# Patient Record
Sex: FEMALE | Race: WHITE | NOT HISPANIC OR LATINO | URBAN - METROPOLITAN AREA
[De-identification: names, ages, dates, MRNs, and addresses within clinical notes are randomized per-mention and may not be internally consistent; named-entity substitution may affect disease eponyms.]

---

## 2017-03-13 ENCOUNTER — CHARTING TRANS (OUTPATIENT)
Dept: URGENT CARE | Age: 47
End: 2017-03-13

## 2017-03-13 ASSESSMENT — PAIN SCALES - GENERAL: PAINLEVEL_OUTOF10: 3

## 2017-05-01 ENCOUNTER — CHARTING TRANS (OUTPATIENT)
Dept: URGENT CARE | Age: 47
End: 2017-05-01

## 2017-05-01 ENCOUNTER — MYAURORA ACCOUNT LINK (OUTPATIENT)
Dept: OTHER | Age: 47
End: 2017-05-01

## 2017-05-01 ASSESSMENT — PAIN SCALES - GENERAL: PAINLEVEL_OUTOF10: 3

## 2017-07-30 DIAGNOSIS — N92.1 BREAKTHROUGH BLEEDING ON OCPS: ICD-10-CM

## 2017-07-31 ENCOUNTER — TELEPHONE (OUTPATIENT)
Dept: FAMILY MEDICINE CLINIC | Facility: CLINIC | Age: 47
End: 2017-07-31

## 2017-07-31 RX ORDER — NORGESTIMATE AND ETHINYL ESTRADIOL 0.25-0.035
1 KIT ORAL DAILY
Qty: 28 TABLET | Refills: 1 | Status: SHIPPED | OUTPATIENT
Start: 2017-07-31 | End: 2017-09-26

## 2017-07-31 NOTE — TELEPHONE ENCOUNTER
Left message for patient that sprintec 28 filled for 2 more months only. Patient needs physical, due in august.    No future appointments.

## 2017-07-31 NOTE — TELEPHONE ENCOUNTER
Refill request for sprintec. Protocol failed, unable to approve. LOV 9/10/2016 LF 8/22/2016. No future appointments.

## 2017-07-31 NOTE — TELEPHONE ENCOUNTER
Please call in Rx of Sprintec 28 dispensed 1 with 1 refill and needs to schedule annual physical.  Due 8/22/2017. give 1 to 2 months to complete physical.  If does not complete, will not continue refilling medication. Please inform.

## 2017-08-03 ENCOUNTER — TELEPHONE (OUTPATIENT)
Dept: FAMILY MEDICINE CLINIC | Facility: CLINIC | Age: 47
End: 2017-08-03

## 2017-08-03 DIAGNOSIS — F33.1 MODERATE EPISODE OF RECURRENT MAJOR DEPRESSIVE DISORDER (HCC): ICD-10-CM

## 2017-08-03 RX ORDER — VENLAFAXINE HYDROCHLORIDE 75 MG/1
CAPSULE, EXTENDED RELEASE ORAL
Qty: 90 CAPSULE | Refills: 0 | Status: SHIPPED | OUTPATIENT
Start: 2017-08-03 | End: 2017-08-04

## 2017-08-03 NOTE — TELEPHONE ENCOUNTER
LMOM last refill given to patient, needs physical. Called to schedule. Patient also has existing mammogram order that is good for 2 additional weeks before expiring. Patient can call and speak to nurse with questions. No future appointments.

## 2017-08-03 NOTE — TELEPHONE ENCOUNTER
Not seen in almost a year. Will give 1 refill but no others until completes physical and can address depression screen and monitoring at that time. Remind patient her mammogram is still good and should complete in the next 2 weeks prior to expiring.   She

## 2017-08-03 NOTE — TELEPHONE ENCOUNTER
Refill request for venlafaxine. No protocol, cannot approve. LOV 10/5/2016. LF 7/26/2016 #90 w/ 0. Please advise.

## 2017-08-04 DIAGNOSIS — F33.1 MODERATE EPISODE OF RECURRENT MAJOR DEPRESSIVE DISORDER (HCC): ICD-10-CM

## 2017-08-04 RX ORDER — VENLAFAXINE HYDROCHLORIDE 75 MG/1
CAPSULE, EXTENDED RELEASE ORAL
Qty: 90 CAPSULE | Refills: 0 | Status: SHIPPED | OUTPATIENT
Start: 2017-08-04 | End: 2017-09-11

## 2017-08-04 NOTE — TELEPHONE ENCOUNTER
Refill request for venlafaxine, rx from yesterday did not send. No protocol, cannot fill. LOV 10/5/2016. LM for patient that she needs OV.     Future Appointments  Date Time Provider Tamara Reddy   9/11/2017 9:00 AM Tanvir Mg DO EM[facility] 30 [facility] [place]

## 2017-09-11 ENCOUNTER — OFFICE VISIT (OUTPATIENT)
Dept: FAMILY MEDICINE CLINIC | Facility: CLINIC | Age: 47
End: 2017-09-11

## 2017-09-11 VITALS
WEIGHT: 174.19 LBS | RESPIRATION RATE: 18 BRPM | BODY MASS INDEX: 27.02 KG/M2 | SYSTOLIC BLOOD PRESSURE: 100 MMHG | DIASTOLIC BLOOD PRESSURE: 64 MMHG | HEIGHT: 67.2 IN | OXYGEN SATURATION: 98 % | HEART RATE: 78 BPM | TEMPERATURE: 99 F

## 2017-09-11 DIAGNOSIS — Z13.228 SCREENING FOR ENDOCRINE, NUTRITIONAL, METABOLIC AND IMMUNITY DISORDER: ICD-10-CM

## 2017-09-11 DIAGNOSIS — Z00.00 ANNUAL PHYSICAL EXAM: Primary | ICD-10-CM

## 2017-09-11 DIAGNOSIS — Z13.0 SCREENING FOR ENDOCRINE, NUTRITIONAL, METABOLIC AND IMMUNITY DISORDER: ICD-10-CM

## 2017-09-11 DIAGNOSIS — Z13.21 SCREENING FOR ENDOCRINE, NUTRITIONAL, METABOLIC AND IMMUNITY DISORDER: ICD-10-CM

## 2017-09-11 DIAGNOSIS — Z13.0 SCREENING FOR IRON DEFICIENCY ANEMIA: ICD-10-CM

## 2017-09-11 DIAGNOSIS — Z12.4 SCREENING FOR CERVICAL CANCER: ICD-10-CM

## 2017-09-11 DIAGNOSIS — Z13.6 SCREENING FOR HEART DISEASE: ICD-10-CM

## 2017-09-11 DIAGNOSIS — Z12.39 SCREENING FOR BREAST CANCER: ICD-10-CM

## 2017-09-11 DIAGNOSIS — F33.1 MODERATE EPISODE OF RECURRENT MAJOR DEPRESSIVE DISORDER (HCC): ICD-10-CM

## 2017-09-11 DIAGNOSIS — Z13.29 SCREENING FOR ENDOCRINE, NUTRITIONAL, METABOLIC AND IMMUNITY DISORDER: ICD-10-CM

## 2017-09-11 DIAGNOSIS — N92.1 BREAKTHROUGH BLEEDING ON OCPS: ICD-10-CM

## 2017-09-11 PROCEDURE — 99396 PREV VISIT EST AGE 40-64: CPT | Performed by: FAMILY MEDICINE

## 2017-09-11 RX ORDER — VENLAFAXINE HYDROCHLORIDE 75 MG/1
75 CAPSULE, EXTENDED RELEASE ORAL
Qty: 90 CAPSULE | Refills: 3 | Status: SHIPPED | OUTPATIENT
Start: 2017-09-11 | End: 2018-11-09

## 2017-09-11 RX ORDER — VENLAFAXINE HYDROCHLORIDE 75 MG/1
CAPSULE, EXTENDED RELEASE ORAL
Qty: 90 CAPSULE | Refills: 3 | Status: CANCELLED | OUTPATIENT
Start: 2017-09-11

## 2017-09-11 RX ORDER — VENLAFAXINE HYDROCHLORIDE 75 MG/1
75 CAPSULE, EXTENDED RELEASE ORAL
Qty: 90 CAPSULE | Refills: 3 | Status: SHIPPED | OUTPATIENT
Start: 2017-09-11 | End: 2017-09-11 | Stop reason: CLARIF

## 2017-09-11 NOTE — PATIENT INSTRUCTIONS
Perform labs fasting 8 hours with water or black coffee or or black tea diet  soda only prior to exam.    -Encourage healthy diet of whole food and avoid processed food and sugary drinks and sodas.   Diet should include lean meats and vegetables including 5 Blood pressure All women in this age group Every 2 years if your blood pressure is less than 120/80 mm Hg; yearly if your systolic blood pressure is 120 to 139 mm Hg, or your diastolic blood pressure reading is 80 to 89 mm Hg   Breast cancer All women in t Hepatitis A Women at increased risk for infection – talk with your healthcare provider 2 doses given 6 months apart   Hepatitis B Women at increased risk for infection – talk with your healthcare provider 3 doses over 6 months; second dose should be given Date Last Reviewed: 8/27/2015  © 4671-7758 [address], [address] [place], [place] [address]. All rights reserved. This information is not intended as a substitute for professional medical care.  Always follow your healthcare professional · Inactivity makes your bones lose strength and become thinner. Over time, thin bones may break. Women who aren't active are at a high risk for osteoporosis. · Certain medicines, such as cortisone, increase bone loss. They also decrease bone growth.  Ask y Cheddar cheese   205 mg/1 oz.   Navy beans, cooked   79 mg/1/2 cup   Kale   90 mg/1/2 cup   Ice cream strawberry   79 mg/1/2 cup           Orange, navel   56 mg/1 medium   Note: Calcium levels may vary depending on brand and size.   Daily calcium needs  14- · Rickets, in children  You may also need this test if you are at risk for low vitamin D levels.  Risks include:  · Being an older adult  · Having difficulty absorbing fat from your diet  · Having chronic kidney disease  · Have dark skin pigmentation  · Travis Francisco Taking a blood sample with a needle carries risks that include bleeding, infection, bruising, and a sense of lightheadedness. When the needle pricks your arm, you may feel a slight stinging sensation or pain.  Afterward, the site may be slightly sore.   [place]

## 2017-09-11 NOTE — PROGRESS NOTES
REASON FOR VISIT:    Clement Moreira is a 51year old female who presents for an [address] , not sexually active  focussing on her career. Has 13year old son. Recently promoted at job. Denies any depressive symptoms.   Gisel : 192 lb  05/23/16 : 194 lb 3.2 oz    Body mass index is 27.12 kg/m².     No results found for: GLUCOSE  No results found for: CHOLEST  No results found for: HDL  No results found for: TRIGLY  No results found for: LDL  No results found for: AST  No results kg/m².       Preventive Services for Which Recommendations Vary with Risk Recommendation Internal Lab or Procedure External Lab or Procedure   Cholesterol Screening Recommended screening varies with age, risk and gender No results found for: LDL, LDLC    Laura White Loss (MOD      SOCIAL HISTORY:   Smoking status: Never Smoker                                                              Smokeless tobacco: Never Used                      Alcohol use: Yes           0.6 oz/week     Glasses of wine: 1 per week     Comment bilaterally. No CMT. No speculum since Pap not needed.   RECTAL: Patent  MUSCULOSKELETAL: back is not tender, FROM of the back  EXTREMITIES: no cyanosis, clubbing or edema  NEURO: Oriented times three, cranial nerves are intact, motor and sensory are ninoska counseling perfomed  Exercise counseling perfomed  STI Prevention counseling perfomed    SUGGESTED VACCINATIONS - Influenza, Pneumococcal, Zoster, Tetanus     Immunization History   Administered Date(s) Administered   • >= 3 Yrs, Influenza ABOCOKS,(03544),

## 2017-09-18 ENCOUNTER — HOSPITAL ENCOUNTER (OUTPATIENT)
Dept: MAMMOGRAPHY | Age: 47
Discharge: HOME | End: 2017-09-18
Attending: FAMILY MEDICINE
Payer: COMMERCIAL

## 2017-09-18 ENCOUNTER — TELEPHONE (OUTPATIENT)
Dept: FAMILY MEDICINE CLINIC | Facility: CLINIC | Age: 47
End: 2017-09-18

## 2017-09-18 DIAGNOSIS — Z00.00 ANNUAL PHYSICAL EXAM: ICD-10-CM

## 2017-09-18 DIAGNOSIS — Z12.39 SCREENING FOR BREAST CANCER: ICD-10-CM

## 2017-09-18 PROCEDURE — 77067 SCR MAMMO BI INCL CAD: CPT | Performed by: FAMILY MEDICINE

## 2017-09-18 NOTE — TELEPHONE ENCOUNTER
Patient would like to speak to nurse or dr regarding her mammogram result. Please call back at cell [phone] or work number [phone].

## 2017-09-18 NOTE — TELEPHONE ENCOUNTER
Spoke with pt regarding Mammogram, pt is concerned about Calcifications, she will have additional imaging done as recommended by radiology.  Reassured pt, all questions answered

## 2017-09-26 DIAGNOSIS — N92.1 BREAKTHROUGH BLEEDING ON OCPS: ICD-10-CM

## 2017-09-26 RX ORDER — NORGESTIMATE AND ETHINYL ESTRADIOL 0.25-0.035
1 KIT ORAL DAILY
Qty: 28 TABLET | Refills: 2 | Status: SHIPPED | OUTPATIENT
Start: 2017-09-26 | End: 2017-12-16

## 2017-09-26 NOTE — TELEPHONE ENCOUNTER
Pt requesting refill on birth control, protocol passed, refill approved    LOV 9/11/17    LF 7/31/17 #28 w/1    Future Appointments  Date Time Provider Tamara Reddy   9/29/2017 12:40 PM [facility] RM1 [address] [facility]

## 2017-09-29 ENCOUNTER — TELEPHONE (OUTPATIENT)
Dept: FAMILY MEDICINE CLINIC | Facility: CLINIC | Age: 47
End: 2017-09-29

## 2017-09-29 ENCOUNTER — HOSPITAL ENCOUNTER (OUTPATIENT)
Dept: MAMMOGRAPHY | Facility: HOSPITAL | Age: 47
Discharge: HOME | End: 2017-09-29
Attending: FAMILY MEDICINE
Payer: COMMERCIAL

## 2017-09-29 DIAGNOSIS — R92.8 ABNORMAL MAMMOGRAM OF BOTH BREASTS: ICD-10-CM

## 2017-09-29 DIAGNOSIS — R92.1 BREAST CALCIFICATION, RIGHT: Primary | ICD-10-CM

## 2017-09-29 PROCEDURE — 77066 DX MAMMO INCL CAD BI: CPT | Performed by: FAMILY MEDICINE

## 2017-09-29 NOTE — IMAGING NOTE
Asssisted Dr. Cathy Colbert with recommendation for a right stereotactic breast biopsy for calcifications. Emotional and educational support provided. Written information provided to Kathleen Soliman.  Our breast center schedulers will call pt within 72 hour

## 2017-09-29 NOTE — TELEPHONE ENCOUNTER
Incoming call from radiology patient will need stereotactic biopsy of right breast performed, mammogram performed today. Please order biopsy if you agree w/ plan. Thank you.

## 2017-10-13 ENCOUNTER — HOSPITAL ENCOUNTER (OUTPATIENT)
Dept: MAMMOGRAPHY | Facility: HOSPITAL | Age: 47
Discharge: HOME | End: 2017-10-13
Attending: FAMILY MEDICINE
Payer: COMMERCIAL

## 2017-10-13 DIAGNOSIS — R92.1 BREAST CALCIFICATION, RIGHT: ICD-10-CM

## 2017-10-13 PROCEDURE — 88305 TISSUE EXAM BY PATHOLOGIST: CPT | Performed by: FAMILY MEDICINE

## 2017-10-13 PROCEDURE — 19081 BX BREAST 1ST LESION STRTCTC: CPT | Performed by: FAMILY MEDICINE

## 2017-10-16 ENCOUNTER — TELEPHONE (OUTPATIENT)
Dept: MAMMOGRAPHY | Facility: HOSPITAL | Age: 47
End: 2017-10-16

## 2017-10-16 NOTE — TELEPHONE ENCOUNTER
Telephoned Dian Tse and name,  verified with pt. Notified Alissa Essex [facility] of benign right breast stereotactic biopsy result. Alissa Essex Famularo reports biopsy site is healing well. Hematoma management discussed.  Radiologist recommend

## 2017-10-29 DIAGNOSIS — F33.1 MODERATE EPISODE OF RECURRENT MAJOR DEPRESSIVE DISORDER (HCC): ICD-10-CM

## 2017-10-30 DIAGNOSIS — F33.1 MODERATE EPISODE OF RECURRENT MAJOR DEPRESSIVE DISORDER (HCC): ICD-10-CM

## 2017-10-30 RX ORDER — VENLAFAXINE HYDROCHLORIDE 75 MG/1
75 CAPSULE, EXTENDED RELEASE ORAL
Qty: 90 CAPSULE | Refills: 3
Start: 2017-10-30

## 2017-10-30 RX ORDER — VENLAFAXINE HYDROCHLORIDE 75 MG/1
75 CAPSULE, EXTENDED RELEASE ORAL
Qty: 90 CAPSULE | Refills: 3 | Status: CANCELLED
Start: 2017-10-30

## 2017-10-30 NOTE — TELEPHONE ENCOUNTER
From: Bambi Galan  Sent: 10/30/2017 11:59 AM CDT  Subject: Medication Renewal Request    Esteban Henry.  Janny would like a refill of the following medications:     Venlafaxine HCl ER 75 MG Oral Capsule SR 24 Hr Anna Irene DO]   Patient Comment: I

## 2017-10-30 NOTE — TELEPHONE ENCOUNTER
From: Anali Olson  Sent: 10/29/2017 2:00 PM CDT  Subject: Medication Renewal Request    Elly Soliman would like a refill of the following medications:     Venlafaxine HCl ER 75 MG Oral Capsule SR 24 Hr Minor Flank, DO]    Preferred pharmacy Left flank pain, nausea/vomiting

## 2017-11-22 ENCOUNTER — CHARTING TRANS (OUTPATIENT)
Dept: URGENT CARE | Age: 47
End: 2017-11-22

## 2017-11-22 ENCOUNTER — MYAURORA ACCOUNT LINK (OUTPATIENT)
Dept: OTHER | Age: 47
End: 2017-11-22

## 2017-11-22 ASSESSMENT — PAIN SCALES - GENERAL: PAINLEVEL_OUTOF10: 0

## 2017-12-16 DIAGNOSIS — N92.1 BREAKTHROUGH BLEEDING ON OCPS: ICD-10-CM

## 2017-12-18 NOTE — TELEPHONE ENCOUNTER
Pt requesting refill on birth control, protocol passed, refill approved    LOV 9/11/17    LF 9/26/17 #28 w/2    No future appointments.

## 2017-12-26 ENCOUNTER — TELEPHONE (OUTPATIENT)
Dept: FAMILY MEDICINE CLINIC | Facility: CLINIC | Age: 47
End: 2017-12-26

## 2017-12-26 NOTE — TELEPHONE ENCOUNTER
Patient states MMR was drawn as she had to pay separate charge. Will wait to see if labs result in EPIC. [facility] Comp metabolic panel is in system, MMR is not. Will update patient when MMR is finalized.  Will call with test results once re

## 2017-12-26 NOTE — TELEPHONE ENCOUNTER
Form in progress, patient had labs drawn, however, titers were not drawn. Patient needs MMR titers drawn per form. Left message for patient to discuss. No future appointments.

## 2017-12-27 NOTE — TELEPHONE ENCOUNTER
Patient requested form be faxed to [facility] at [phone]. Patient had questions regarding labs, answered patients questions. Will fax form after receiving Md signature, patient also requests a copy at  for .

## 2018-03-06 ENCOUNTER — MYAURORA ACCOUNT LINK (OUTPATIENT)
Dept: OTHER | Age: 48
End: 2018-03-06

## 2018-03-06 ENCOUNTER — CHARTING TRANS (OUTPATIENT)
Dept: OTHER | Age: 48
End: 2018-03-06

## 2018-03-06 ASSESSMENT — PAIN SCALES - GENERAL: PAINLEVEL_OUTOF10: 0

## 2018-03-17 DIAGNOSIS — N92.1 BREAKTHROUGH BLEEDING ON OCPS: ICD-10-CM

## 2018-03-17 RX ORDER — NORGESTIMATE AND ETHINYL ESTRADIOL 0.25-0.035
1 KIT ORAL DAILY
Qty: 28 TABLET | Refills: 2 | Status: CANCELLED
Start: 2018-03-17

## 2018-03-19 NOTE — TELEPHONE ENCOUNTER
Pt requesting refill on birth control, protocol passed, refill approved    LOV 9/11/17    LF 12/18/17      No future appointments.

## 2018-03-19 NOTE — TELEPHONE ENCOUNTER
From: Marta Casillas  Sent: 3/17/2018 7:49 AM CDT  Subject: Medication Renewal Request    Myrna Maldonado.  Janny would like a refill of the following medications:     SPRINTEC 28 0.25-35 MG-MCG Oral Tab ROCCO [place]    Preferred pharmacy: [email or web address]

## 2018-08-28 DIAGNOSIS — N92.1 BREAKTHROUGH BLEEDING ON OCPS: ICD-10-CM

## 2018-08-28 NOTE — TELEPHONE ENCOUNTER
Pt requesting refill on birth control, protocol passed, refill approved    LOV 9/11/18    LF 3/19/18    No future appointments.

## 2018-10-28 DIAGNOSIS — F33.1 MODERATE EPISODE OF RECURRENT MAJOR DEPRESSIVE DISORDER (HCC): ICD-10-CM

## 2018-10-29 RX ORDER — VENLAFAXINE HYDROCHLORIDE 75 MG/1
75 CAPSULE, EXTENDED RELEASE ORAL
Qty: 90 CAPSULE | Refills: 3 | OUTPATIENT
Start: 2018-10-29

## 2018-10-29 NOTE — TELEPHONE ENCOUNTER
Pt requesting refill of Venlafaxine, no protocol , unable to approve:     Last Time Medication was Filled:  9/11/17    Last Office Visit with PCP: 9/11/17    No future appointments.   Refill denied, pt has not been seen in over 1 year

## 2018-11-09 ENCOUNTER — OFFICE VISIT (OUTPATIENT)
Dept: FAMILY MEDICINE CLINIC | Facility: CLINIC | Age: 48
End: 2018-11-09
Payer: COMMERCIAL

## 2018-11-09 VITALS
OXYGEN SATURATION: 98 % | WEIGHT: 201 LBS | DIASTOLIC BLOOD PRESSURE: 70 MMHG | HEIGHT: 68 IN | HEART RATE: 72 BPM | TEMPERATURE: 98 F | SYSTOLIC BLOOD PRESSURE: 110 MMHG | RESPIRATION RATE: 20 BRPM | BODY MASS INDEX: 30.46 KG/M2

## 2018-11-09 DIAGNOSIS — Z00.00 ANNUAL PHYSICAL EXAM: Primary | ICD-10-CM

## 2018-11-09 DIAGNOSIS — Z13.6 SCREENING FOR HEART DISEASE: ICD-10-CM

## 2018-11-09 DIAGNOSIS — Z13.21 SCREENING FOR ENDOCRINE, NUTRITIONAL, METABOLIC AND IMMUNITY DISORDER: ICD-10-CM

## 2018-11-09 DIAGNOSIS — N95.1 HOT FLUSHES, PERIMENOPAUSAL: ICD-10-CM

## 2018-11-09 DIAGNOSIS — Z23 NEED FOR VACCINATION: ICD-10-CM

## 2018-11-09 DIAGNOSIS — Z13.228 SCREENING FOR ENDOCRINE, NUTRITIONAL, METABOLIC AND IMMUNITY DISORDER: ICD-10-CM

## 2018-11-09 DIAGNOSIS — F33.1 MODERATE EPISODE OF RECURRENT MAJOR DEPRESSIVE DISORDER (HCC): ICD-10-CM

## 2018-11-09 DIAGNOSIS — Z13.0 SCREENING FOR IRON DEFICIENCY ANEMIA: ICD-10-CM

## 2018-11-09 DIAGNOSIS — Z13.0 SCREENING FOR ENDOCRINE, NUTRITIONAL, METABOLIC AND IMMUNITY DISORDER: ICD-10-CM

## 2018-11-09 DIAGNOSIS — Z13.29 SCREENING FOR ENDOCRINE, NUTRITIONAL, METABOLIC AND IMMUNITY DISORDER: ICD-10-CM

## 2018-11-09 DIAGNOSIS — N92.1 BREAKTHROUGH BLEEDING ON OCPS: ICD-10-CM

## 2018-11-09 DIAGNOSIS — Z12.39 SCREENING FOR MALIGNANT NEOPLASM OF BREAST: ICD-10-CM

## 2018-11-09 PROCEDURE — 90686 IIV4 VACC NO PRSV 0.5 ML IM: CPT | Performed by: FAMILY MEDICINE

## 2018-11-09 PROCEDURE — 90471 IMMUNIZATION ADMIN: CPT | Performed by: FAMILY MEDICINE

## 2018-11-09 PROCEDURE — 99396 PREV VISIT EST AGE 40-64: CPT | Performed by: FAMILY MEDICINE

## 2018-11-09 RX ORDER — NORGESTIMATE AND ETHINYL ESTRADIOL 0.25-0.035
1 KIT ORAL DAILY
Qty: 84 TABLET | Refills: 3 | Status: SHIPPED | OUTPATIENT
Start: 2018-11-09 | End: 2019-09-09

## 2018-11-09 RX ORDER — VENLAFAXINE HYDROCHLORIDE 75 MG/1
75 CAPSULE, EXTENDED RELEASE ORAL
Qty: 90 CAPSULE | Refills: 3 | Status: SHIPPED | OUTPATIENT
Start: 2018-11-09 | End: 2019-09-09

## 2018-11-09 NOTE — PROGRESS NOTES
REASON FOR VISIT:    Mavis Chua is a 51year old female who presents for an [address]. ,[place]  focussing on her career. Has 13year old son. Recently promoted at job. Denies any depressive symptoms.   Living with mg by mouth daily. Disp:  Rfl:    Multiple Vitamins-Minerals (WOMENS MULTIVITAMIN PLUS) Oral Tab Take by mouth.  Disp:  Rfl:      Wt Readings from Last 6 Encounters:  11/09/18 : 201 lb  09/11/17 : 174 lb 3.2 oz  10/03/16 : 181 lb  08/22/16 : 186 lb 6.4 oz on 09/10/2019    Chlamydia Screening Screen Annually age<25, if sex active/on OCPs; >24 high risk No results found for: CHLAMYDIA    Colonoscopy Screen Every 10 years There are no preventive care reminders to display for this patient.     Flex Sigmoidoscopy mouth daily. Disp:  Rfl:    Multiple Vitamins-Minerals (WOMENS MULTIVITAMIN PLUS) Oral Tab Take by mouth.  Disp:  Rfl:       MEDICAL INFORMATION:   Past Medical History:   Diagnosis Date   • Allergic rhinitis    • Anxiety    • Depression       Past Surgical clear  EYES:PERRLA, EOMI, normal optic disk, conjunctiva are clear  NECK: supple, no adenopathy, no bruits  CHEST: no chest tenderness  BREAST: Normal breasts bilateral.  No masses.   Axilla clear bilateral.  No nipple discharge or abnormal contour bilatera Lipid Panel [E]  - [facility] DIGITAL SCRN BILAT W/CAD (CPT=/21643); Future    2: Moderate episode of recurrent depressive disorder in remission. Negative depression screen and appears very well.   Continue venlafaxine  -contracts for safety- if suicidal or 1957 and not immune

## 2018-11-09 NOTE — PATIENT INSTRUCTIONS
Perform labs fasting 8 hours with water or black coffee or or black tea diet  soda only prior to exam.    -Encourage healthy diet of whole food and avoid processed food and sugary drinks and sodas.   Diet should include lean meats and vegetables including 5 Perimenopause is sometimes called the menopause transition. It happens in the months or years before menopause. It may begin when you reach your mid-40s. During this time, your estrogen levels go up and down and then decrease.  As a result, you may notice s © 5913-0824 [address]. [address] [facility], [address]. All rights reserved. This information is not intended as a substitute for professional medical care. Always follow your healthcare professional's instructions.         Warning Don't leave the person alone. Anyone who is at imminent risk of suicide needs psychiatric services right away. The person must be continuously monitored, and never left out of sight. Call 911 or a 24-hour suicide crisis hotline.  It can be found in the whit You need to continue exercising to keep these benefits. Your main goal is to make fitness a lifetime commitment. Build a fitness plan that you can stick with. Choose activities you like. Go slowly, especially when just starting out.  Work up to being active © 1116-3127 [address]. 1407 [facility], [address] [place]. All rights reserved. This information is not intended as a substitute for professional medical care. Always follow your healthcare professional's instructions.         Eating · Managing calories. A calorie is a unit of energy. Your body burns calories for fuel, but if you eat more calories than your body burns, the extras are stored as fat. Your healthcare provider can help you create a diet plan to manage your calories.  This w A liu part of healthy cooking is cutting down on added fat and salt. Look on the internet for lower-fat, lower-sodium recipes. Also, try these tips:  · Remove fat from meat and skin from poultry before cooking.   · Skim fat from the surface of soups and kathy

## 2018-11-27 VITALS
OXYGEN SATURATION: 97 % | TEMPERATURE: 97.5 F | SYSTOLIC BLOOD PRESSURE: 140 MMHG | RESPIRATION RATE: 18 BRPM | HEART RATE: 72 BPM | DIASTOLIC BLOOD PRESSURE: 83 MMHG

## 2018-11-28 VITALS
OXYGEN SATURATION: 99 % | TEMPERATURE: 97.7 F | SYSTOLIC BLOOD PRESSURE: 118 MMHG | HEART RATE: 87 BPM | RESPIRATION RATE: 16 BRPM | DIASTOLIC BLOOD PRESSURE: 88 MMHG

## 2018-11-28 VITALS
OXYGEN SATURATION: 97 % | TEMPERATURE: 98.3 F | RESPIRATION RATE: 20 BRPM | SYSTOLIC BLOOD PRESSURE: 124 MMHG | DIASTOLIC BLOOD PRESSURE: 73 MMHG | HEART RATE: 90 BPM

## 2018-11-30 ENCOUNTER — OFFICE VISIT (OUTPATIENT)
Dept: FAMILY MEDICINE CLINIC | Facility: CLINIC | Age: 48
End: 2018-11-30
Payer: COMMERCIAL

## 2018-11-30 VITALS
RESPIRATION RATE: 16 BRPM | HEART RATE: 80 BPM | OXYGEN SATURATION: 99 % | TEMPERATURE: 98 F | SYSTOLIC BLOOD PRESSURE: 112 MMHG | DIASTOLIC BLOOD PRESSURE: 64 MMHG

## 2018-11-30 DIAGNOSIS — J06.9 VIRAL URI WITH COUGH: Primary | ICD-10-CM

## 2018-11-30 PROCEDURE — 99213 OFFICE O/P EST LOW 20 MIN: CPT | Performed by: NURSE PRACTITIONER

## 2018-11-30 RX ORDER — BENZONATATE 200 MG/1
200 CAPSULE ORAL 3 TIMES DAILY PRN
Qty: 20 CAPSULE | Refills: 0 | Status: SHIPPED | OUTPATIENT
Start: 2018-11-30 | End: 2018-12-07

## 2018-11-30 RX ORDER — AZITHROMYCIN 250 MG/1
TABLET, FILM COATED ORAL
Qty: 6 TABLET | Refills: 0 | Status: SHIPPED | OUTPATIENT
Start: 2018-11-30 | End: 2019-09-09 | Stop reason: ALTCHOICE

## 2018-11-30 NOTE — PROGRESS NOTES
CHIEF COMPLAINT:   Patient presents with:  Cough: dry      HPI:   Dm Diaz is a 51year old female who presents for upper respiratory symptoms for  3 days. Has worsened over the past 24 hours. Patient reports dry cough, denies fever.   Repo Types: 1 Glasses of wine per week      Comment: once every 3-4 months    Drug use: No        REVIEW OF SYSTEMS:   GENERAL: fatigue, malaise normal appetite  SKIN: no rashes or abnormal skin lesions  HEENT: See HPI  LUNGS: denies shortness of breath or Sig: Take two tablets by mouth today, then one tablet daily. Risks, benefits, and side effects of medication explained and discussed.     Patient Instructions     Viral Upper Respiratory Illness (Adult)  You have a viral upper respiratory illness (U · Over-the-counter cold medicines will not shorten the length of time you’re sick, but they may be helpful for the following symptoms: cough, sore throat, and nasal and sinus congestion.  (Note: Do not use decongestants if you have high blood pressure.)  Fo

## 2019-03-06 VITALS
SYSTOLIC BLOOD PRESSURE: 135 MMHG | OXYGEN SATURATION: 98 % | RESPIRATION RATE: 16 BRPM | HEART RATE: 74 BPM | TEMPERATURE: 98.5 F | DIASTOLIC BLOOD PRESSURE: 81 MMHG

## 2019-09-09 ENCOUNTER — TELEPHONE (OUTPATIENT)
Dept: FAMILY MEDICINE CLINIC | Facility: CLINIC | Age: 49
End: 2019-09-09

## 2019-09-09 ENCOUNTER — OFFICE VISIT (OUTPATIENT)
Dept: FAMILY MEDICINE CLINIC | Facility: CLINIC | Age: 49
End: 2019-09-09
Payer: COMMERCIAL

## 2019-09-09 VITALS
WEIGHT: 203 LBS | BODY MASS INDEX: 31.12 KG/M2 | HEART RATE: 75 BPM | OXYGEN SATURATION: 98 % | HEIGHT: 67.75 IN | TEMPERATURE: 99 F | RESPIRATION RATE: 18 BRPM | SYSTOLIC BLOOD PRESSURE: 122 MMHG | DIASTOLIC BLOOD PRESSURE: 84 MMHG

## 2019-09-09 DIAGNOSIS — Z13.228 SCREENING FOR ENDOCRINE, NUTRITIONAL, METABOLIC AND IMMUNITY DISORDER: ICD-10-CM

## 2019-09-09 DIAGNOSIS — Z00.00 ANNUAL PHYSICAL EXAM: Primary | ICD-10-CM

## 2019-09-09 DIAGNOSIS — Z13.21 SCREENING FOR ENDOCRINE, NUTRITIONAL, METABOLIC AND IMMUNITY DISORDER: ICD-10-CM

## 2019-09-09 DIAGNOSIS — Z12.11 SCREEN FOR COLON CANCER: ICD-10-CM

## 2019-09-09 DIAGNOSIS — F33.1 MODERATE EPISODE OF RECURRENT MAJOR DEPRESSIVE DISORDER (HCC): ICD-10-CM

## 2019-09-09 DIAGNOSIS — Z12.39 SCREENING FOR MALIGNANT NEOPLASM OF BREAST: ICD-10-CM

## 2019-09-09 DIAGNOSIS — N92.1 BREAKTHROUGH BLEEDING ON OCPS: ICD-10-CM

## 2019-09-09 DIAGNOSIS — N95.1 HOT FLUSHES, PERIMENOPAUSAL: ICD-10-CM

## 2019-09-09 DIAGNOSIS — Z13.0 SCREENING FOR IRON DEFICIENCY ANEMIA: ICD-10-CM

## 2019-09-09 DIAGNOSIS — Z13.0 SCREENING FOR ENDOCRINE, NUTRITIONAL, METABOLIC AND IMMUNITY DISORDER: ICD-10-CM

## 2019-09-09 DIAGNOSIS — Z13.29 SCREENING FOR ENDOCRINE, NUTRITIONAL, METABOLIC AND IMMUNITY DISORDER: ICD-10-CM

## 2019-09-09 DIAGNOSIS — Z23 NEED FOR MMR VACCINE: ICD-10-CM

## 2019-09-09 DIAGNOSIS — Z23 NEED FOR VACCINATION: ICD-10-CM

## 2019-09-09 DIAGNOSIS — Z13.6 SCREENING FOR HEART DISEASE: ICD-10-CM

## 2019-09-09 DIAGNOSIS — Z12.4 SCREENING FOR CERVICAL CANCER: ICD-10-CM

## 2019-09-09 PROCEDURE — 99396 PREV VISIT EST AGE 40-64: CPT | Performed by: FAMILY MEDICINE

## 2019-09-09 PROCEDURE — 90471 IMMUNIZATION ADMIN: CPT | Performed by: FAMILY MEDICINE

## 2019-09-09 PROCEDURE — 90707 MMR VACCINE SC: CPT | Performed by: FAMILY MEDICINE

## 2019-09-09 PROCEDURE — 90472 IMMUNIZATION ADMIN EACH ADD: CPT | Performed by: FAMILY MEDICINE

## 2019-09-09 PROCEDURE — 88175 CYTOPATH C/V AUTO FLUID REDO: CPT | Performed by: FAMILY MEDICINE

## 2019-09-09 PROCEDURE — 87624 HPV HI-RISK TYP POOLED RSLT: CPT | Performed by: FAMILY MEDICINE

## 2019-09-09 PROCEDURE — 90686 IIV4 VACC NO PRSV 0.5 ML IM: CPT | Performed by: FAMILY MEDICINE

## 2019-09-09 RX ORDER — NORGESTIMATE AND ETHINYL ESTRADIOL 0.25-0.035
1 KIT ORAL DAILY
Qty: 84 TABLET | Refills: 0 | Status: SHIPPED | OUTPATIENT
Start: 2019-09-09 | End: 2020-08-21

## 2019-09-09 RX ORDER — NORGESTIMATE AND ETHINYL ESTRADIOL 0.25-0.035
1 KIT ORAL DAILY
Qty: 84 TABLET | Refills: 3 | Status: SHIPPED | OUTPATIENT
Start: 2019-09-09 | End: 2019-09-09

## 2019-09-09 RX ORDER — VENLAFAXINE HYDROCHLORIDE 75 MG/1
75 CAPSULE, EXTENDED RELEASE ORAL
Qty: 90 CAPSULE | Refills: 3 | Status: SHIPPED | OUTPATIENT
Start: 2019-09-09 | End: 2020-08-21

## 2019-09-09 NOTE — PATIENT INSTRUCTIONS
As of October 6th 2014, the Drug Enforcement Agency [facility]) is reclassifying all hydrocodone combination medications from Schedule III to Schedule II. This includes medications such as Norco, Vicodin, Lortab, Zohydro, and Vicoprofen.      What this means for exam.    -Encourage healthy diet of whole food and avoid processed food and sugary drinks and sodas. Diet should include lean meats and vegetables including 5-7 servings of fruit and vegetables total in 1 day.   Never skip breakfast.  -Encouraged exercise Type 2 diabetes or prediabetes All women beginning at age 39 and women without symptoms at any age who are overweight or obese and have 1 or more additional risk factors for diabetes.  At  least every 3 years   Type 2 diabetes or prediabetes All women mark or family health history. Talk with your healthcare provider about your health history.    Depression All women in this age group At routine exams   Gonorrhea Sexually active women at increased risk for infection At routine exams   Hepatitis C Anyone at inc no record of these infections or vaccines 1 dose   Meningococcal Women at increased risk for infection – talk with your healthcare provider 1 or more doses   Pneumococcal conjugate vaccine (PCV13) and pneumococcal polysaccharide vaccine (PPSV23) Women at i when this cancer is found and removed early, the chances of a full recovery are very good. Because colorectal cancer rarely causes symptoms in its early stages, screening for the disease is important.  It’s even more crucial if you have risk factors for the health history, examine you, and do 1 or more tests. To start, you may have:  · Health history questions to answer. Your healthcare provider will ask about your health history. Mention if a family member has had colon cancer or polyps.  Also mention any hea the colon with a contrast liquid (barium) and air. This can be uncomfortable for some people. The liquid helps the colon show up clearly on the X-rays. Because the test uses X-rays, it exposes you to a small amount of radiation.   Virtual colonoscopy  This rectum. This test also lets your healthcare provider remove any pieces of tissue that need to be looked at by a lab. If something suspicious is found using any other tests, you will likely need a colonoscopy.   · Sigmoidoscopy. This test is similar to colon to every 10 years, depending on factors such as your:  · Age  · Health history  · Family health history  · Symptoms  · Results from any prior colonoscopy  Risks and possible complications  These include:  · Bleeding               · A puncture or tear in th need to schedule a follow-up visit to talk about the results. After the test, you can go back to your normal eating and other activities. You may be tired from the sedation and need to rest for a few hours.  Discuss your medications with your provider to un them.  Exercise on most days. Aim for a total of 150 or more minutes of moderate to  vigorous intensity activity each week. Consider 40 minutes, 3 to 4 times a week. For best results, activity should last for 40 minutes on average.  It is OK to work up to t tasty, healthy eating plan that you can stick to for the rest of your life. Goals for healthy eating  Below are some tips to improve your eating habits:  · Limit saturated fats and trans fats.  Saturated fats raise your levels of cholesterol, so keep the of fat. Try low-fat or nonfat milk, cheese, or yogurt. · Healthy fats can be good for you in small amounts. These are unsaturated fats, such as olive oil, nuts, and fish.  Try to have at least 2 servings per week of fatty fish, such as salmon, sardines, ma To help prevent osteoporosis, you need to exercise and nourish your bones throughout your life. Childhood  The body builds the most bone during these years. That's why boys and girls need foods rich in calcium. They also need plenty of exercise.  A healt build and repair bones. But it can't make calcium on its own. That's why it's important to eat calcium-rich foods. Some foods are naturally rich in calcium. Others have calcium added (fortified). It's best to get calcium from the foods you eat.  But if you 25(OH)D  What is this test?  Vitamin D is mainly found in fortified dairy foods, juice, breakfast cereal, and certain fish. This vitamin plays many roles in the body.  But because it helps the body absorb calcium from foods and supplements, it's particularl what your test results mean for you. Children and adults need more than 30 nanograms per milliliter (ng/ml) of vitamin D. The optimal level of 25(OH)D is usually between 30 and 60 ng/mL.  Recommended daily amounts range from 400 to 800 international units Osteoporosis: Avoiding Bone Loss  Certain factors can speed up bone loss or decrease bone growth. For example, alcohol, cigarettes, and certain medicines reduce bone mass. Some foods make it hard for your body to absorb calcium.     Things to avoid  Here ar you less prone to injury. Exercises include lifting small weights, doing push-ups and sit-ups, using elastic exercise bands, and using weight machines. Weight-bearing activities. These help your whole body. They also help you maintain bone mass.  Nito

## 2019-09-09 NOTE — PROGRESS NOTES
REASON FOR VISIT:    Francia Bolivar is a 51year old female who presents for an [address] -not sexually active greater than 1 year  focussing on her career. Has 16year old son. Recently promoted at job.   Denies any depr Sleep, (SIMPLY SLEEP) 25 MG Oral Tab Take 50 mg by mouth nightly as needed for Sleep. Disp:  Rfl:    Cetirizine HCl 5 MG Oral Chew Tab Chew 5 mg by mouth daily. Disp:  Rfl:    Multiple Vitamins-Minerals (WOMENS MULTIVITAMIN PLUS) Oral Tab Take by mouth.  Tammy Lipscomb External Lab or Procedure   Breast Cancer Screening   Every 2 yrs age 54-69 Mammogram due on 10/13/2018    Pap Every 3 yrs age 21-65 or Pap and HPV every 5 yrs age 33-67 Pap Smear,2 Years due on 09/10/2019    Chlamydia Screening Screen Annually age<25, if by mouth once daily. Disp: 90 capsule Rfl: 3   DiphenhydrAMINE HCl, Sleep, (SIMPLY SLEEP) 25 MG Oral Tab Take 50 mg by mouth nightly as needed for Sleep. Disp:  Rfl:    Cetirizine HCl 5 MG Oral Chew Tab Chew 5 mg by mouth daily.  Disp:  Rfl:    Multiple Vit GENERAL: well developed, well nourished, in no apparent distress  SKIN: no rashes, no suspicious lesions, multiple cystic comedones on forehead, nose, for head, no pustules or blackheads  HEENT: atraumatic, normocephalic, ears and throat are clear  EYES: couples prevent STD  -immunizations-flu shot, MMR born before 5. Live vaccine declined pregnancy test not active. Doing well on OCP to prevent break through bleeding. Consider removal age 48 may continue for at least another year.   Last refill until c Rubella Titer 12/23/2017   • TDAP 10/01/2013   • Tb Intradermal Test 10/03/2016   • Varicella Deferred (Had Chicken Pox) 05/01/1976       Influenza Annually   Pneumococcal if high risk   Td/Tdap once then every 10 years   HPV Females 11-26: 3 doses   Zoste

## 2019-09-10 LAB — HPV I/H RISK 1 DNA SPEC QL NAA+PROBE: NEGATIVE

## 2019-09-10 NOTE — TELEPHONE ENCOUNTER
LM and sent referral information via My Chart.    Recommended patient to return call if she had additional questions

## 2019-09-10 NOTE — TELEPHONE ENCOUNTER
Please follow-up with:    Dr. Panfilo Thomas MD-female referral placed  [facility]     [facility] [address]  [address], [address] [place] [address]    Phone: [phone]  Fax:

## 2019-09-16 ENCOUNTER — TELEPHONE (OUTPATIENT)
Dept: FAMILY MEDICINE CLINIC | Facility: CLINIC | Age: 49
End: 2019-09-16

## 2019-09-16 NOTE — TELEPHONE ENCOUNTER
LMOM to return call to the office. Provided pt office phone [phone] along with office hours given.

## 2019-09-16 NOTE — TELEPHONE ENCOUNTER
----- Message from Ilda Chen DO sent at 9/13/2019  5:08 PM CDT -----  Normal Pap smear and HPV. Repeat in 3 years. Mychart notified.

## 2020-01-26 ENCOUNTER — OFFICE VISIT (OUTPATIENT)
Dept: FAMILY MEDICINE CLINIC | Facility: CLINIC | Age: 50
End: 2020-01-26
Payer: COMMERCIAL

## 2020-01-26 VITALS
SYSTOLIC BLOOD PRESSURE: 96 MMHG | HEART RATE: 106 BPM | TEMPERATURE: 100 F | DIASTOLIC BLOOD PRESSURE: 79 MMHG | OXYGEN SATURATION: 98 % | RESPIRATION RATE: 20 BRPM

## 2020-01-26 DIAGNOSIS — R52 BODY ACHES: ICD-10-CM

## 2020-01-26 DIAGNOSIS — R11.2 NAUSEA AND VOMITING, INTRACTABILITY OF VOMITING NOT SPECIFIED, UNSPECIFIED VOMITING TYPE: ICD-10-CM

## 2020-01-26 DIAGNOSIS — J02.0 STREP PHARYNGITIS: Primary | ICD-10-CM

## 2020-01-26 LAB
CONTROL LINE PRESENT WITH A CLEAR BACKGROUND (YES/NO): PRESENT YES/NO
KIT LOT #: ABNORMAL NUMERIC
POCT INFLUENZA A: NEGATIVE
POCT INFLUENZA B: NEGATIVE
POCT LOT NUMBER: NORMAL
STREP GRP A CUL-SCR: POSITIVE

## 2020-01-26 PROCEDURE — 87502 INFLUENZA DNA AMP PROBE: CPT | Performed by: NURSE PRACTITIONER

## 2020-01-26 PROCEDURE — 87880 STREP A ASSAY W/OPTIC: CPT | Performed by: NURSE PRACTITIONER

## 2020-01-26 PROCEDURE — 99213 OFFICE O/P EST LOW 20 MIN: CPT | Performed by: NURSE PRACTITIONER

## 2020-01-26 RX ORDER — CEPHALEXIN 500 MG/1
500 CAPSULE ORAL 2 TIMES DAILY
Qty: 20 CAPSULE | Refills: 0 | Status: SHIPPED | OUTPATIENT
Start: 2020-01-26 | End: 2020-02-05

## 2020-01-26 NOTE — PROGRESS NOTES
CHIEF COMPLAINT:   Patient presents with:  Sore Throat      HPI:   Jules Chung is a 47year old female presents to clinic with symptoms of sore throat, body aches. Patient has had for 1 days. + chills and fatigue, reports throat feels swollen.   A GI: denies abdominal pain, constipation and diarrhea  NEURO: denies dizziness or lightheadedness    EXAM:   BP 96/79   Pulse 106   Temp 99.8 °F (37.7 °C) (Oral)   Resp 20   LMP 11/15/2019 (Approximate)   SpO2 98%   GENERAL: well developed, well nourished,m PLAN: + strep. Reports cannot tolerate amoxil due to nausea/vomiting. Will given keflex. Meds as below. Push fluids, change toothbrush after 3 doses of antibiotics. Motrin per package instructions for pain.   Follow up with PCP if no improvement in 2-3 d · You may use acetaminophen or ibuprofen to control pain or fever, unless another medicine was prescribed for this. Talk with your healthcare provider before taking these medicines if you have chronic liver or kidney disease.  Also talk with your healthcare The patient is asked to return if sx's persist or worsen. Increase fluids, Motrin/Tylenol prn, rest.  Patient is to follow up if fever greater than or equal to 100.4 persists for 72 hours.

## 2020-01-26 NOTE — PATIENT INSTRUCTIONS
Pharyngitis: Strep (Confirmed)    You have had a positive test for strep throat. Strep throat is a contagious illness. It is spread by coughing, kissing or by touching others after touching your mouth or nose.  Symptoms include throat pain that is worse w · Lymph nodes getting larger or becoming soft in the middle  · You can't swallow liquids or you can't open your mouth wide because of throat pain  · Signs of dehydration. These include very dark urine or no urine, sunken eyes, and dizziness.   · Trouble travis

## 2020-07-10 ENCOUNTER — WALK IN (OUTPATIENT)
Dept: URGENT CARE | Age: 50
End: 2020-07-10

## 2020-07-10 DIAGNOSIS — S09.11XA STRAIN OF MASSETER MUSCLE, INITIAL ENCOUNTER: Primary | ICD-10-CM

## 2020-07-10 PROCEDURE — 99213 OFFICE O/P EST LOW 20 MIN: CPT | Performed by: FAMILY MEDICINE

## 2020-07-10 ASSESSMENT — PAIN SCALES - GENERAL: PAINLEVEL: 3-4

## 2020-07-15 ENCOUNTER — TELEMEDICINE (OUTPATIENT)
Dept: FAMILY MEDICINE CLINIC | Facility: CLINIC | Age: 50
End: 2020-07-15
Payer: COMMERCIAL

## 2020-07-15 DIAGNOSIS — M26.609 TMJ (TEMPOROMANDIBULAR JOINT SYNDROME): Primary | ICD-10-CM

## 2020-07-15 PROCEDURE — 99213 OFFICE O/P EST LOW 20 MIN: CPT | Performed by: FAMILY MEDICINE

## 2020-07-15 RX ORDER — CHLORZOXAZONE 500 MG/1
500 TABLET ORAL NIGHTLY PRN
Qty: 21 TABLET | Refills: 0 | Status: SHIPPED | OUTPATIENT
Start: 2020-07-15 | End: 2020-08-21 | Stop reason: ALTCHOICE

## 2020-07-15 NOTE — PROGRESS NOTES
Due to COVID-19 ACTION PLAN, the patient's office visit was converted to a phone  Visit due to patient convenience with pandemic.   Time Spent: 16 mins     Subjective     HPI:   Cassidy Harrison is a 47year old female who presents for right jaw pain 6 Date 01/26/2020 7/10/2020    • POCT Procedure Control 01/26/2020 Control Valid              Assessment    Diagnoses and all orders for this visit:    TMJ (temporomandibular joint syndrome)    Other orders  -     chlorzoxazone 500 MG Oral Tab;  Take 1 tablet relationship, due to the on-going public health crisis/national emergency and because of restrictions of visitation. There are limitations of this visit as no physical exam could be performed.   Every conscious effort was taken to allow for sufficient and

## 2020-07-15 NOTE — PATIENT INSTRUCTIONS
Understanding Temporomandibular Disorders (TMD)    Do you have pain in your face, jaw, or teeth? Do you have trouble chewing? Does your jaw make clicking or popping noises? These symptoms can be caused by temporomandibular disorders (TMD).  This term desc · Pay attention to your body and get help if symptoms return. Wilfrido last reviewed this educational content on 8/1/2017  © 8490-5351 [employer]. [address] [facility] [place], [place], [address] [place] [address]. All rights reserved.  This information is not inten · Keep follow-up appointments.  Be sure to keep all appointments with your healthcare team.                                                                                                                                Managing stress  Stress is a liu facto You have been diagnosed with temporomandibular disorder (TMD). This term describes a group of problems related to the temporomandibular joint (TMJ) and nearby muscles. The TMJ is located where the upper and lower jaws meet.  TMD can cause painful and frustr · Massage, both inside and outside the mouth. This relaxes muscles and improves circulation. · Palpation, which is applying pressure to points of the jaw and face with the fingers. · Cold spray and stretching of the muscles to relax them.   · An anestheti The temporomandibular joint (TMJ) is the joint that connects your lower jaw to your head. You can feel it in front of your ears when you open and close your mouth. TMJ disorders involve chronic or recurrent pain in the joint.  When treated, symptoms of TMJ · You may take acetaminophen or ibuprofen for pain, unless you were given a different pain medicine.  (Note: If you have chronic liver or kidney disease or have ever had a stomach ulcer or gastrointestinal bleeding, talk with your healthcare provider before Follow up with your healthcare provider, or as advised. Further testing and additional treatment may be required. If changes to your lifestyle do not improve your symptoms, talk with your healthcare provider about other available therapies.  These include b · Choose soft foods. These include scrambled eggs, oatmeal, yogurt, quiche, tofu, soup, smoothies, pasta, fish, mashed potatoes, milkshakes, bananas, applesauce, gelatin, or ice cream.  · Don’t bite into hard foods.  These include whole apples, carrots, and · Keep ergonomics in mind. This includes making sure your workstation fits your body. Support your lower back. Take breaks often to stretch and rest. If you use a computer, keep the monitor at eye level.   · Keep your head in a neutral position. [place] your · Have been taking water pills (diuretic medicine)  · Have taken anticholinergic medicine, such as atropine  · Have had injury to the duct or injury to that area of your face  · Have gout  · Have smoked or still smoke  Parotid duct obstruction can also be © 5120-6399 [employer]. [address] [facility] [place] [facility] [place]. All rights reserved. This information is not intended as a substitute for professional medical care. Always follow your healthcare professional's instructions.         Treatme Most of the time, this kind of infection soon goes away with antibiotics. In other cases a more severe infection may happen. You may have an infection of the deep layers of the skin. This can lead to an abscess in your gland or neck.  If your symptoms don’t Tests are being done to determine the cause of the swelling. These may include blood tests, X-ray, ultrasound, CT scan, or injection of dye into the duct to look for blockage. Treatment depends on the exact cause of the swelling.   Home care  · If the area © 9961-9064 [employer]. 1407 [facility] [place], [facility] [place]. All rights reserved. This information is not intended as a substitute for professional medical care. Always follow your healthcare professional's instructions.

## 2020-08-21 ENCOUNTER — OFFICE VISIT (OUTPATIENT)
Dept: FAMILY MEDICINE CLINIC | Facility: CLINIC | Age: 50
End: 2020-08-21
Payer: COMMERCIAL

## 2020-08-21 VITALS
DIASTOLIC BLOOD PRESSURE: 76 MMHG | HEIGHT: 68 IN | BODY MASS INDEX: 32.76 KG/M2 | OXYGEN SATURATION: 99 % | HEART RATE: 74 BPM | RESPIRATION RATE: 18 BRPM | TEMPERATURE: 98 F | SYSTOLIC BLOOD PRESSURE: 108 MMHG | WEIGHT: 216.19 LBS

## 2020-08-21 DIAGNOSIS — Z00.00 ANNUAL PHYSICAL EXAM: Primary | ICD-10-CM

## 2020-08-21 DIAGNOSIS — Z13.6 SCREENING FOR HEART DISEASE: ICD-10-CM

## 2020-08-21 DIAGNOSIS — F33.1 MODERATE EPISODE OF RECURRENT MAJOR DEPRESSIVE DISORDER (HCC): ICD-10-CM

## 2020-08-21 DIAGNOSIS — Z12.11 SCREEN FOR COLON CANCER: ICD-10-CM

## 2020-08-21 DIAGNOSIS — Z13.0 SCREENING FOR ENDOCRINE, NUTRITIONAL, METABOLIC AND IMMUNITY DISORDER: ICD-10-CM

## 2020-08-21 DIAGNOSIS — Z13.0 SCREENING FOR IRON DEFICIENCY ANEMIA: ICD-10-CM

## 2020-08-21 DIAGNOSIS — Z13.228 SCREENING FOR ENDOCRINE, NUTRITIONAL, METABOLIC AND IMMUNITY DISORDER: ICD-10-CM

## 2020-08-21 DIAGNOSIS — Z13.21 SCREENING FOR ENDOCRINE, NUTRITIONAL, METABOLIC AND IMMUNITY DISORDER: ICD-10-CM

## 2020-08-21 DIAGNOSIS — Z12.31 ENCOUNTER FOR SCREENING MAMMOGRAM FOR MALIGNANT NEOPLASM OF BREAST: ICD-10-CM

## 2020-08-21 DIAGNOSIS — Z13.29 SCREENING FOR ENDOCRINE, NUTRITIONAL, METABOLIC AND IMMUNITY DISORDER: ICD-10-CM

## 2020-08-21 PROBLEM — N95.1 HOT FLUSHES, PERIMENOPAUSAL: Status: RESOLVED | Noted: 2018-11-09 | Resolved: 2020-08-21

## 2020-08-21 PROBLEM — N92.1 BREAKTHROUGH BLEEDING ON OCPS: Status: RESOLVED | Noted: 2017-09-11 | Resolved: 2020-08-21

## 2020-08-21 PROCEDURE — 3078F DIAST BP <80 MM HG: CPT | Performed by: FAMILY MEDICINE

## 2020-08-21 PROCEDURE — 3074F SYST BP LT 130 MM HG: CPT | Performed by: FAMILY MEDICINE

## 2020-08-21 PROCEDURE — 99396 PREV VISIT EST AGE 40-64: CPT | Performed by: FAMILY MEDICINE

## 2020-08-21 PROCEDURE — 3008F BODY MASS INDEX DOCD: CPT | Performed by: FAMILY MEDICINE

## 2020-08-21 RX ORDER — VENLAFAXINE HYDROCHLORIDE 75 MG/1
75 CAPSULE, EXTENDED RELEASE ORAL
Qty: 90 CAPSULE | Refills: 3 | Status: SHIPPED | OUTPATIENT
Start: 2020-08-21 | End: 2021-03-18

## 2020-08-21 RX ORDER — CETIRIZINE HYDROCHLORIDE 10 MG/1
10 TABLET, CHEWABLE ORAL DAILY
Qty: 90 TABLET | Refills: 0 | Status: SHIPPED | OUTPATIENT
Start: 2020-08-21

## 2020-08-21 NOTE — PATIENT INSTRUCTIONS
Colorectal Cancer Screening    Colorectal cancer is cancer in the colon or rectum. It's a leading cause of cancer deaths in the [place] But when this cancer is found and removed early, the chances of a full recovery are very good.  Because colorectal cancer Polyps are growths that form on the inner lining of the colon or rectum. Most are benign, which means they aren’t cancer. But over time, some polyps can become cancer (malignant). This happens when cells in these polyps start growing abnormally.  In time, ophelia If you have a family history of colon cancer or are at high risk for other reasons, you may need to have screening even earlier. Talk with your provider to find out about your risk factors. Screening advice varies among expert groups.  It's important to al Colonoscopy is the only screening test that lets your healthcare provider see the entire colon and rectum. This test also lets your healthcare provider remove any pieces of tissue that need to be looked at by a lab.  If something suspicious is found using a Talk with your healthcare provider about which tests might be right for you. No matter which test you choose, the most important thing is that you get screened.  Keep in mind that if cancer is found at an early stage during screening, treatment is more like You may wonder how you can improve the health of your heart. If you’re thinking about exercise, you’re on the right track. You don’t need to become an athlete, but you do need a certain amount of brisk exercise to help strengthen your heart.  If you have be Choose one or more activities you enjoy. Walking is one of the easiest things you can do. You can also try swimming, riding a bike, dancing, or taking an exercise class.   Stop exercising and call your doctor if you:  · Have chest pain or feel dizzy or ligh · Reduce sodium (salt) intake. Eating too much salt may increase your blood pressure. Limit your sodium intake to 2,300 milligrams (mg) per day (the amount in 1 teaspoon of salt), or less if your healthcare provider recommends it.  Dining out less often and Healthy eating starts at the grocery store. Be sure to pay attention to food labels on packaged foods. Look for products that are high in fiber and protein, and low in saturated fat, cholesterol, and sodium. Avoid products that contain trans fat.  And pay c During young adulthood, bones become their strongest. This is called peak bone mass. The same good habits that kept bones healthy in childhood help keep bones healthy in adulthood. Age 27 to menopause  Bone mass declines slightly during these years.  Your Your body needs calcium to build and repair bones. But it can't make calcium on its own. That's why it's important to eat calcium-rich foods. Some foods are naturally rich in calcium. Others have calcium added (fortified).  It's best to get calcium from the 25-hydroxyvitamin D (25-high-DROX-ee-VIE-tuh-min D), 25(OH)D  What is this test?  Vitamin D is mainly found in fortified dairy foods, juice, breakfast cereal, and certain fish. This vitamin plays many roles in the body.  But because it helps the body absorb Test results may vary depending on your age, gender, health history, the method used for the test, and other things. Your test results may not mean you have a problem. Ask your healthcare provider what your test results mean for you.    Children and adults © 7745-6758 [employer]. 1407 [facility] [place], [address] [place]. All rights reserved. This information is not intended as a substitute for professional medical care. Always follow your healthcare professional's instructions.           Preve If you have osteoporosis, exercise is vital for your health. It can prevent bone fractures and spine changes. It will slow bone loss. Exercise will strengthen your body. It can also be fun.  A variety of exercises is best. See below for exercises that can h

## 2020-08-21 NOTE — PROGRESS NOTES
REASON FOR VISIT:    Mary Cardenas is a 47year old female who presents for an [address]. Sleep study scheduled sept 9 through dentist    -not sexually active greater than 1 year  focussing on her career.  Has 16year old so Dispense Refill   • chlorzoxazone 500 MG Oral Tab Take 1 tablet (500 mg total) by mouth nightly as needed for Muscle spasms. 21 tablet 0   • Venlafaxine HCl ER 75 MG Oral Capsule SR 24 Hr Take 1 capsule (75 mg total) by mouth once daily.  90 capsule 3   • N No    Domestic Abuse: No     CAGE:     Cut : No    Annoyed : No    Guilty : No    Eye Opener : No    Scoring  Total Score: 0     Depression Screening (PHQ-2/PHQ-9): Over the LAST 2 WEEKS   Little interest or pleasure in doing things (over the last two week components found for: PPDINDURAT      Disease Monitoring: none    ALLERGIES:     Amoxicillin             UNKNOWN    Comment:Vomiting  Erythromycin            NAUSEA ONLY    CURRENT MEDICATIONS:   Current Outpatient Medications   Medication Sig Dispense Ref flashes, denies dysuria, vaginal discharge or itching, periods-amenorrhea since 10/2019  MUSCULOSKELETAL: denies back pain  NEURO: denies headaches  PSYCHE: hx depression or anxiety  HEMATOLOGIC: denies hx of anemia  ENDOCRINE: denies thyroid history  ALL/ processed food and sugary drinks and sodas. Diet should include lean meats and vegetables including 5-7 servings of fruit and vegetables total in 1 day.   Never skip breakfast.  -Encouraged exercise 30 minutes to 60 minutes 5 times  weekly to prevent Roshan David Positive Rubella Titer 12/23/2017   • TDAP 10/01/2013   • Tb Intradermal Test 10/03/2016   • Varicella Deferred (Had Chicken Pox) 05/01/1976       Influenza Annually   Pneumococcal if high risk   Td/Tdap once then every 10 years   HPV Females 11-26: 3 dose

## 2020-09-02 LAB
ABSOLUTE BASOPHILS: 42 CELLS/UL (ref 0–200)
ABSOLUTE EOSINOPHILS: 111 CELLS/UL (ref 15–500)
ABSOLUTE LYMPHOCYTES: 1659 CELLS/UL (ref 850–3900)
ABSOLUTE MONOCYTES: 482 CELLS/UL (ref 200–950)
ABSOLUTE NEUTROPHILS: 3005 CELLS/UL (ref 1500–7800)
ALBUMIN/GLOBULIN RATIO: 1.4 (CALC) (ref 1–2.5)
ALBUMIN: 4 G/DL (ref 3.6–5.1)
ALKALINE PHOSPHATASE: 90 U/L (ref 37–153)
ALT: 22 U/L (ref 6–29)
AST: 20 U/L (ref 10–35)
BASOPHILS: 0.8 %
BILIRUBIN, TOTAL: 0.4 MG/DL (ref 0.2–1.2)
BUN: 14 MG/DL (ref 7–25)
CALCIUM: 9.5 MG/DL (ref 8.6–10.4)
CARBON DIOXIDE: 26 MMOL/L (ref 20–32)
CHLORIDE: 105 MMOL/L (ref 98–110)
CHOL/HDLC RATIO: 2.5 (CALC)
CHOLESTEROL, TOTAL: 191 MG/DL
CREATININE: 0.8 MG/DL (ref 0.5–1.05)
EGFR IF AFRICN AM: 100 ML/MIN/1.73M2
EGFR IF NONAFRICN AM: 86 ML/MIN/1.73M2
EOSINOPHILS: 2.1 %
GLOBULIN: 2.8 G/DL (CALC) (ref 1.9–3.7)
GLUCOSE: 95 MG/DL (ref 65–99)
HDL CHOLESTEROL: 76 MG/DL
HEMATOCRIT: 42.2 % (ref 35–45)
HEMOGLOBIN: 14 G/DL (ref 11.7–15.5)
LDL-CHOLESTEROL: 95 MG/DL (CALC)
LYMPHOCYTES: 31.3 %
MCH: 29.3 PG (ref 27–33)
MCHC: 33.2 G/DL (ref 32–36)
MCV: 88.3 FL (ref 80–100)
MONOCYTES: 9.1 %
MPV: 11.1 FL (ref 7.5–12.5)
NEUTROPHILS: 56.7 %
NON-HDL CHOLESTEROL: 115 MG/DL (CALC)
PLATELET COUNT: 261 THOUSAND/UL (ref 140–400)
POTASSIUM: 4.3 MMOL/L (ref 3.5–5.3)
PROTEIN, TOTAL: 6.8 G/DL (ref 6.1–8.1)
RDW: 13.2 % (ref 11–15)
RED BLOOD CELL COUNT: 4.78 MILLION/UL (ref 3.8–5.1)
SODIUM: 139 MMOL/L (ref 135–146)
TRIGLYCERIDES: 105 MG/DL
TSH W/REFLEX TO FT4: 1.61 MIU/L
WHITE BLOOD CELL COUNT: 5.3 THOUSAND/UL (ref 3.8–10.8)

## 2020-09-02 NOTE — PROGRESS NOTES
Pt notified via [employer]:   All routine physical labs normal. Ok to rpt in 1 year at next physical.

## 2020-10-17 ENCOUNTER — HOSPITAL ENCOUNTER (OUTPATIENT)
Dept: MAMMOGRAPHY | Facility: HOSPITAL | Age: 50
Discharge: HOME | End: 2020-10-17
Attending: FAMILY MEDICINE
Payer: COMMERCIAL

## 2020-10-17 ENCOUNTER — IMMUNIZATION (OUTPATIENT)
Dept: FAMILY MEDICINE CLINIC | Facility: CLINIC | Age: 50
End: 2020-10-17
Payer: COMMERCIAL

## 2020-10-17 DIAGNOSIS — Z00.00 ANNUAL PHYSICAL EXAM: ICD-10-CM

## 2020-10-17 DIAGNOSIS — Z12.31 ENCOUNTER FOR SCREENING MAMMOGRAM FOR MALIGNANT NEOPLASM OF BREAST: ICD-10-CM

## 2020-10-17 PROCEDURE — 90686 IIV4 VACC NO PRSV 0.5 ML IM: CPT | Performed by: FAMILY MEDICINE

## 2020-10-17 PROCEDURE — 77063 BREAST TOMOSYNTHESIS BI: CPT | Performed by: FAMILY MEDICINE

## 2020-10-17 PROCEDURE — 77067 SCR MAMMO BI INCL CAD: CPT | Performed by: FAMILY MEDICINE

## 2020-10-17 PROCEDURE — 90471 IMMUNIZATION ADMIN: CPT | Performed by: FAMILY MEDICINE

## 2020-11-09 ENCOUNTER — TELEPHONE (OUTPATIENT)
Dept: FAMILY MEDICINE CLINIC | Facility: CLINIC | Age: 50
End: 2020-11-09

## 2020-11-09 DIAGNOSIS — Z11.1 PPD SCREENING TEST: Primary | ICD-10-CM

## 2020-11-09 NOTE — TELEPHONE ENCOUNTER
Received from from    State of  [place] on an Adult in a [facility]   It is requiring a TB test.   Order for TB test pending.    Please review and sign if ok    Form on hold in RN forms bin

## 2020-11-10 NOTE — TELEPHONE ENCOUNTER
Spoke to pt to make appts for TB testing   Pt states her employer does not need her to do a TB test again   Form to PCP for completion, review, and signature  Once completed pt states please fax to # [phone] and call her when sent.

## 2020-11-16 NOTE — TELEPHONE ENCOUNTER
Form completed and signed. Faxed to [phone] per pt request and confirmation received.    Pt informed  Copy to scanning

## 2021-03-18 NOTE — PROGRESS NOTES
Due to COVID-19 ACTION PLAN, the patient's office visit was converted to a video visit.   Time Spent: 21 mins    Patient presents with:  Medication Follow-Up    Subjective     HPI:   Biju Méndez is a 45year old female who presents for medication m results within 6 Month(s) from this visit.    Latest known visit with results is:   Office Visit on 08/21/2020   Component Date Value   • WHITE BLOOD CELL COUNT 09/01/2020 5.3    • RED BLOOD CELL COUNT 09/01/2020 4.78    • HEMOGLOBIN 09/01/2020 14.0    • HE 1 capsule (150 mg total) by mouth once daily.  -     TSH W REFLEX TO FREE T4  -contracts for safety- if suicidal or homicidal, call 911 or go to nearest ER and call office  -increased suicide risk on SSRI or SNRI in past  Exercise 3 x weekly x 30-60min  Wi

## 2021-05-11 ENCOUNTER — OFFICE VISIT (OUTPATIENT)
Dept: FAMILY MEDICINE CLINIC | Facility: CLINIC | Age: 51
End: 2021-05-11
Payer: COMMERCIAL

## 2021-05-11 ENCOUNTER — PATIENT MESSAGE (OUTPATIENT)
Dept: FAMILY MEDICINE CLINIC | Facility: CLINIC | Age: 51
End: 2021-05-11

## 2021-05-11 VITALS
BODY MASS INDEX: 33.95 KG/M2 | OXYGEN SATURATION: 98 % | HEART RATE: 86 BPM | HEIGHT: 68 IN | RESPIRATION RATE: 18 BRPM | DIASTOLIC BLOOD PRESSURE: 70 MMHG | SYSTOLIC BLOOD PRESSURE: 110 MMHG | TEMPERATURE: 98 F | WEIGHT: 224 LBS

## 2021-05-11 DIAGNOSIS — Z13.228 SCREENING FOR ENDOCRINE, NUTRITIONAL, METABOLIC AND IMMUNITY DISORDER: ICD-10-CM

## 2021-05-11 DIAGNOSIS — F33.1 MODERATE EPISODE OF RECURRENT MAJOR DEPRESSIVE DISORDER (HCC): ICD-10-CM

## 2021-05-11 DIAGNOSIS — Z12.11 SCREEN FOR COLON CANCER: ICD-10-CM

## 2021-05-11 DIAGNOSIS — Z00.00 ANNUAL PHYSICAL EXAM: Primary | ICD-10-CM

## 2021-05-11 DIAGNOSIS — Z13.6 SCREENING FOR HEART DISEASE: ICD-10-CM

## 2021-05-11 DIAGNOSIS — Z13.0 SCREENING FOR ENDOCRINE, NUTRITIONAL, METABOLIC AND IMMUNITY DISORDER: ICD-10-CM

## 2021-05-11 DIAGNOSIS — Z12.31 ENCOUNTER FOR SCREENING MAMMOGRAM FOR BREAST CANCER: ICD-10-CM

## 2021-05-11 DIAGNOSIS — Z13.29 SCREENING FOR ENDOCRINE, NUTRITIONAL, METABOLIC AND IMMUNITY DISORDER: ICD-10-CM

## 2021-05-11 DIAGNOSIS — Z13.21 SCREENING FOR ENDOCRINE, NUTRITIONAL, METABOLIC AND IMMUNITY DISORDER: ICD-10-CM

## 2021-05-11 DIAGNOSIS — M25.561 ACUTE PAIN OF RIGHT KNEE: ICD-10-CM

## 2021-05-11 DIAGNOSIS — Z13.0 SCREENING FOR IRON DEFICIENCY ANEMIA: ICD-10-CM

## 2021-05-11 PROCEDURE — 3074F SYST BP LT 130 MM HG: CPT | Performed by: FAMILY MEDICINE

## 2021-05-11 PROCEDURE — 3008F BODY MASS INDEX DOCD: CPT | Performed by: FAMILY MEDICINE

## 2021-05-11 PROCEDURE — 99396 PREV VISIT EST AGE 40-64: CPT | Performed by: FAMILY MEDICINE

## 2021-05-11 PROCEDURE — 3078F DIAST BP <80 MM HG: CPT | Performed by: FAMILY MEDICINE

## 2021-05-11 RX ORDER — VENLAFAXINE HYDROCHLORIDE 150 MG/1
150 CAPSULE, EXTENDED RELEASE ORAL
Qty: 90 CAPSULE | Refills: 1 | Status: SHIPPED | OUTPATIENT
Start: 2021-05-11 | End: 2022-01-04

## 2021-05-11 RX ORDER — CELECOXIB 200 MG/1
200 CAPSULE ORAL 2 TIMES DAILY
Qty: 30 CAPSULE | Refills: 0 | Status: SHIPPED | OUTPATIENT
Start: 2021-05-11

## 2021-05-11 NOTE — PATIENT INSTRUCTIONS
Perform labs fasting 8 hours with water or black coffee or or black tea diet  soda only prior to exam.    -Encourage healthy diet of whole food and avoid processed food and sugary drinks and sodas.   Diet should include lean meats and vegetables including 5 exercise? Exercising regularly offers many healthy rewards.  It can help you do all of the following:  · Improve your blood cholesterol level to help prevent further heart trouble  · Lower your blood pressure to help prevent a stroke or heart attack  · Con substitute for professional medical care. Always follow your healthcare professional's instructions. Eating Heart-Healthy Foods  Eating has a big impact on your heart health. In fact, eating healthier can improve several of your heart risks at once.  Omayra Baker keep a diary to record what you eat and how often you exercise. Choose the right foods  Aim to make these foods staples of your diet.  If you have diabetes, you may have different recommendations than what is listed here:  · Fruits and vegetables provide p spice up your food without adding calories, fat, or sodium. Try these items: horseradish, hot sauce, lemon, mustard, nonfat salad dressings, and vinegar. For salt-free herbs and spices, try basil, cilantro, cinnamon, pepper, and rosemary.   Date Last Review increased risk for fractures. With age, the quality and quantity of bone declines. You can lessen bone loss by staying active and increasing your calcium intake. Calcium supplements and other osteoporosis treatments do have risks.  So talk with your healthc may vary depending on brand and size.   Daily calcium needs  15to 25years old: 1,300 mg  23to 27years old: 1,000 mg  32to 48years old: 1,000 mg  46to 79years old, women: 1,200 mg  46to 79years old, men: 1,000 mg  Pregnant or nursin to 18 year disease  · Have dark skin pigmentation  · Being a  baby  Vitamin D has many effects in the body.  You may need this test to help your healthcare provider diagnose or treat:  · Problems with the parathyroid gland  · Cancer  · Autoimmune diseases, soni vitamin D in supplement form can affect your vitamin D levels. Ask your healthcare provider if any health conditions you have or medicines you take could affect your results.   How do I get ready for this test?  Tell your healthcare provider if you take vit [employer] [address]. [address] [facility] [place], [address] [place] [address]. All rights reserved. This information is not intended as a substitute for professional medical care. Always follow your healthcare professional's instructions.           Living with Marcie Linday

## 2021-05-11 NOTE — PROGRESS NOTES
REASON FOR VISIT:    Yisel Solano is a 45year old female who presents for an [address]. No complaints. Doing well with depression. Denies medications side effects. Taking venlafaxine 150mg daily.  Denies depressed or anhedonia, kg)  08/21/20 : 216 lb 3.2 oz (98.1 kg)  09/09/19 : 203 lb (92.1 kg)  11/09/18 : 201 lb (91.2 kg)  09/11/17 : 174 lb 3.2 oz (79 kg)  10/03/16 : 181 lb (82.1 kg)        Patient Active Problem List:     Moderate episode of recurrent major depressive disorder health state?: Good    Type of Diet: Other    How do you maintain positive mental well-being?: Social Interaction;Games    How would you describe your daily physical activity?: Moderate    If you are a male age 38-65 or a female age 47-67, do you take aspi or Procedure External Lab or Procedure   Cholesterol Screening Recommended screening varies with age, risk and gender LDL-CHOLESTEROL (mg/dL (calc))   Date Value   09/01/2020 95       Diabetes Screening  if history of high blood pressure or other  risk fac Grandfather    • Other (Other) Son         AMANDA Hearing Loss (MOD      SOCIAL HISTORY:   Social History    Tobacco Use      Smoking status: Never Smoker      Smokeless tobacco: Never Used    Vaping Use      Vaping Use: Never used    Alcohol use:  Yes      Al murmur  GI: good BS's, no masses, HSM or tenderness  : Normal sternal vagina, labia, introitus. Normal adnexa bilaterally. Normal uterus and ovaries No CMT. .  RECTAL: Patent  MUSCULOSKELETAL: back is not tender, FROM of the back  EXTREMITIES: no cyanosis weekly x 30-60min    3. Acute pain of right knee  - XR KNEE (1 OR 2 VIEWS), RIGHT (CPT=73560); Future  - celecoxib 200 MG Oral Cap; Take 1 capsule (200 mg total) by mouth 2 (two) times daily. Take with food and water. Dispense: 30 capsule;  Refill: 0  Susp

## 2021-05-11 NOTE — TELEPHONE ENCOUNTER
From: Dian Tes  To:  Allen Gunderson, DO  Sent: 5/11/2021 5:34 PM CDT  Subject: Other    My Covid vaccine card

## 2021-05-24 ENCOUNTER — HOSPITAL ENCOUNTER (OUTPATIENT)
Dept: GENERAL RADIOLOGY | Age: 51
Discharge: HOME | End: 2021-05-24
Attending: FAMILY MEDICINE
Payer: COMMERCIAL

## 2021-05-24 DIAGNOSIS — M25.561 ACUTE PAIN OF RIGHT KNEE: ICD-10-CM

## 2021-05-24 PROCEDURE — 73560 X-RAY EXAM OF KNEE 1 OR 2: CPT | Performed by: FAMILY MEDICINE

## 2021-05-25 VITALS
DIASTOLIC BLOOD PRESSURE: 83 MMHG | TEMPERATURE: 98.2 F | HEART RATE: 69 BPM | WEIGHT: 220 LBS | OXYGEN SATURATION: 98 % | RESPIRATION RATE: 18 BRPM | SYSTOLIC BLOOD PRESSURE: 134 MMHG

## 2021-05-27 NOTE — PROGRESS NOTES
Left detailed message for patient, okay per HIPPA release on file. Patient notified of normal results.

## 2021-08-10 LAB
ABSOLUTE BASOPHILS: 28 CELLS/UL (ref 0–200)
ABSOLUTE EOSINOPHILS: 0 CELLS/UL (ref 15–500)
ABSOLUTE LYMPHOCYTES: 1495 CELLS/UL (ref 850–3900)
ABSOLUTE MONOCYTES: 446 CELLS/UL (ref 200–950)
ABSOLUTE NEUTROPHILS: 2631 CELLS/UL (ref 1500–7800)
ALBUMIN/GLOBULIN RATIO: 1.5 (CALC) (ref 1–2.5)
ALBUMIN: 4 G/DL (ref 3.6–5.1)
ALKALINE PHOSPHATASE: 94 U/L (ref 37–153)
ALT: 22 U/L (ref 6–29)
AST: 23 U/L (ref 10–35)
BASOPHILS: 0.6 %
BILIRUBIN, TOTAL: 0.5 MG/DL (ref 0.2–1.2)
BUN: 15 MG/DL (ref 7–25)
CALCIUM: 9.4 MG/DL (ref 8.6–10.4)
CARBON DIOXIDE: 27 MMOL/L (ref 20–32)
CHLORIDE: 106 MMOL/L (ref 98–110)
CHOL/HDLC RATIO: 2.4 (CALC)
CHOLESTEROL, TOTAL: 183 MG/DL
CREATININE: 0.78 MG/DL (ref 0.5–1.05)
EGFR IF AFRICN AM: 102 ML/MIN/1.73M2
EGFR IF NONAFRICN AM: 88 ML/MIN/1.73M2
EOSINOPHILS: 0 %
GLOBULIN: 2.6 G/DL (CALC) (ref 1.9–3.7)
GLUCOSE: 95 MG/DL (ref 65–99)
HDL CHOLESTEROL: 75 MG/DL
HEMATOCRIT: 41.1 % (ref 35–45)
HEMOGLOBIN: 13.2 G/DL (ref 11.7–15.5)
LDL-CHOLESTEROL: 92 MG/DL (CALC)
LYMPHOCYTES: 32.5 %
MCH: 28.9 PG (ref 27–33)
MCHC: 32.1 G/DL (ref 32–36)
MCV: 90.1 FL (ref 80–100)
MONOCYTES: 9.7 %
MPV: 11 FL (ref 7.5–12.5)
NEUTROPHILS: 57.2 %
NON-HDL CHOLESTEROL: 108 MG/DL (CALC)
PLATELET COUNT: 272 THOUSAND/UL (ref 140–400)
POTASSIUM: 4.3 MMOL/L (ref 3.5–5.3)
PROTEIN, TOTAL: 6.6 G/DL (ref 6.1–8.1)
RDW: 12.8 % (ref 11–15)
RED BLOOD CELL COUNT: 4.56 MILLION/UL (ref 3.8–5.1)
SODIUM: 140 MMOL/L (ref 135–146)
TRIGLYCERIDES: 70 MG/DL
TSH W/REFLEX TO FT4: 0.74 MIU/L
WHITE BLOOD CELL COUNT: 4.6 THOUSAND/UL (ref 3.8–10.8)

## 2021-11-05 ENCOUNTER — TELEPHONE (OUTPATIENT)
Dept: FAMILY MEDICINE CLINIC | Facility: CLINIC | Age: 51
End: 2021-11-05

## 2021-11-05 NOTE — TELEPHONE ENCOUNTER
Medical Report on an Adult in a [employer] form received. Filled out and pending signature    Form is also asking for pt's last TB Skin test.     Last TB skin test in 2016. LMOM to return call to the office.   Does she need to have updated TB

## 2021-11-05 NOTE — TELEPHONE ENCOUNTER
Pt calling in states that her work will accept the TB Skin test done in 2016 since she hasn't left the country since then    Form filled out and signed by Rock Weber DO   Per pt request, faxed back to her employer at [phone]  Confirmation received

## 2021-11-13 ENCOUNTER — OFFICE VISIT (OUTPATIENT)
Dept: FAMILY MEDICINE CLINIC | Facility: CLINIC | Age: 51
End: 2021-11-13
Payer: COMMERCIAL

## 2021-11-13 DIAGNOSIS — Z20.822 ENCOUNTER FOR LABORATORY TESTING FOR COVID-19 VIRUS: ICD-10-CM

## 2021-11-13 DIAGNOSIS — R05.9 COUGH: Primary | ICD-10-CM

## 2021-11-13 PROCEDURE — 99213 OFFICE O/P EST LOW 20 MIN: CPT | Performed by: FAMILY MEDICINE

## 2021-11-13 NOTE — PATIENT INSTRUCTIONS
Most viral illnesses get worse for the first 3-5 days, then plateau and improve gradually over the next 3-5 days. Monitor symptoms for now.    Use OTC meds for comfort as needed--  Ibuprofen/Tylenol for fever/pain  Use Benadryl at bedtime to reduce draina

## 2021-11-13 NOTE — PROGRESS NOTES
CHIEF COMPLAINT:   Patient presents with:  Cough: Was coughing all night. Woke up with a very husky voice.  - Entered by patient        HPI:   Dian Tse is a 45year old female presents to clinic with complaints of cough that started yesterday dizziness or lightheadedness    EXAM:   There were no vitals taken for this visit.   GENERAL: well developed, well nourished, in no apparent distress  SKIN: no rashes,no suspicious lesions  HEAD: atraumatic, normocephalic  EYES: conjunctiva clear  EARS: TM' congestion and thin secretions. Use Delsym for cough. Consider applying queenie's vapo-rub or eucayptus oil to chest and feet at bedtime to reduce chest and nasal congestion.    Warm tea with honey, cough lozenges, vaporizers/steam etc.    If no better in

## 2021-11-20 ENCOUNTER — HOSPITAL ENCOUNTER (OUTPATIENT)
Dept: MAMMOGRAPHY | Facility: HOSPITAL | Age: 51
Discharge: HOME | End: 2021-11-20
Attending: FAMILY MEDICINE
Payer: COMMERCIAL

## 2021-11-20 DIAGNOSIS — Z00.00 ANNUAL PHYSICAL EXAM: ICD-10-CM

## 2021-11-20 DIAGNOSIS — Z12.31 ENCOUNTER FOR SCREENING MAMMOGRAM FOR BREAST CANCER: ICD-10-CM

## 2021-11-20 PROCEDURE — 77067 SCR MAMMO BI INCL CAD: CPT | Performed by: FAMILY MEDICINE

## 2021-11-20 PROCEDURE — 77063 BREAST TOMOSYNTHESIS BI: CPT | Performed by: FAMILY MEDICINE

## 2021-11-30 ENCOUNTER — HOSPITAL ENCOUNTER (OUTPATIENT)
Dept: MAMMOGRAPHY | Facility: HOSPITAL | Age: 51
Discharge: HOME | End: 2021-11-30
Attending: FAMILY MEDICINE
Payer: COMMERCIAL

## 2021-11-30 DIAGNOSIS — R92.2 INCONCLUSIVE MAMMOGRAM: ICD-10-CM

## 2021-11-30 PROCEDURE — 77061 BREAST TOMOSYNTHESIS UNI: CPT | Performed by: FAMILY MEDICINE

## 2021-11-30 PROCEDURE — 76642 ULTRASOUND BREAST LIMITED: CPT | Performed by: FAMILY MEDICINE

## 2021-11-30 PROCEDURE — 77065 DX MAMMO INCL CAD UNI: CPT | Performed by: FAMILY MEDICINE

## 2021-12-01 PROBLEM — R92.1 BREAST CALCIFICATIONS ON MAMMOGRAM: Status: ACTIVE | Noted: 2021-12-01

## 2021-12-02 ENCOUNTER — TELEMEDICINE (OUTPATIENT)
Dept: FAMILY MEDICINE CLINIC | Facility: CLINIC | Age: 51
End: 2021-12-02
Payer: COMMERCIAL

## 2021-12-02 VITALS — BODY MASS INDEX: 31.83 KG/M2 | HEIGHT: 68 IN | WEIGHT: 210 LBS | RESPIRATION RATE: 16 BRPM

## 2021-12-02 DIAGNOSIS — M72.2 PLANTAR FASCIITIS OF RIGHT FOOT: Primary | ICD-10-CM

## 2021-12-02 PROCEDURE — 99213 OFFICE O/P EST LOW 20 MIN: CPT | Performed by: FAMILY MEDICINE

## 2021-12-02 PROCEDURE — 3008F BODY MASS INDEX DOCD: CPT | Performed by: FAMILY MEDICINE

## 2021-12-02 RX ORDER — CELECOXIB 200 MG/1
200 CAPSULE ORAL DAILY
Qty: 30 CAPSULE | Refills: 0 | Status: SHIPPED | OUTPATIENT
Start: 2021-12-02

## 2021-12-02 NOTE — PROGRESS NOTES
Due to COVID-19 ACTION PLAN, the patient's office visit was converted to a video visit.   Time Spent: 9 mins    Patient presents with:  Heel Pain: right heel pain     Subjective     HPI:   Yisel Solano is a 45year old female who presents for right visit.          Physical Exam:  Resp 16   Ht 5' 8\" (1.727 m)   Wt 210 lb (95.3 kg)   LMP 11/15/2019 (LMP Unknown)   BMI 31.93 kg/m²     alert and cooperative, well-nourished/well-hydrated, no no pallor or tachypnea, appropriately groomed, speaking in full following visit was completed using two-way, real-time interactive audio and/or video communication.   This has been done in good kierra to provide continuity of care in the best interest of the provider-patient relationship, due to the on-going public healt

## 2021-12-09 ENCOUNTER — PATIENT MESSAGE (OUTPATIENT)
Dept: FAMILY MEDICINE CLINIC | Facility: CLINIC | Age: 51
End: 2021-12-09

## 2021-12-10 NOTE — TELEPHONE ENCOUNTER
From: Joseph Alanis  To: Wilfrido Be DO  Sent: 12/9/2021 5:17 PM CST  Subject: Updated Covid Card    Hi, I got my booster and wanted to send a copy of my updated Covid card. Thanks!  Trey Sanchez

## 2021-12-29 DIAGNOSIS — F33.1 MODERATE EPISODE OF RECURRENT MAJOR DEPRESSIVE DISORDER (HCC): ICD-10-CM

## 2021-12-29 NOTE — TELEPHONE ENCOUNTER
Refill no protocol available:     Pt requesting refill of   Requested Prescriptions     Pending Prescriptions Disp Refills   • VENLAFAXINE 150 MG Oral Capsule SR 24 Hr [Pharmacy Med Name: VENLAFAXINE HCL  MG CAP] 90 capsule 1     Sig: TAKE 1 CAPSULE

## 2022-01-04 RX ORDER — VENLAFAXINE HYDROCHLORIDE 150 MG/1
150 CAPSULE, EXTENDED RELEASE ORAL
Qty: 90 CAPSULE | Refills: 1 | Status: SHIPPED | OUTPATIENT
Start: 2022-01-04

## 2022-01-07 NOTE — PROGRESS NOTES
Due to COVID-19 ACTION PLAN, the patient's office visit was converted to a video visit. Time Spent: 7 mins +5 minutes writing note for total of 12 minutes.     Patient presents with:  Medication Follow-Up    Subjective     HPI:   Biju Stokes is a Physical Exam:  LMP 11/15/2019 (LMP Unknown)     alert and cooperative, well-nourished/well-hydrated, no no pallor or tachypnea, appropriately groomed, speaking in full sentences comfortably and Normal work of breathing, answers questions appropriat because of restrictions of visitation. There are limitations of this visit as no physical exam could be performed. Every conscious effort was taken to allow for sufficient and adequate time.   This billing visit was spent on reviewing labs, medications, r

## 2022-02-02 ENCOUNTER — LAB ENCOUNTER (OUTPATIENT)
Dept: LAB | Facility: HOSPITAL | Age: 52
End: 2022-02-02
Attending: INTERNAL MEDICINE
Payer: COMMERCIAL

## 2022-02-02 DIAGNOSIS — Z01.818 PRE-OP TESTING: ICD-10-CM

## 2022-02-03 LAB — SARS-COV-2 RNA RESP QL NAA+PROBE: NOT DETECTED

## 2022-02-05 PROBLEM — Z12.11 SPECIAL SCREENING FOR MALIGNANT NEOPLASM OF COLON: Status: ACTIVE | Noted: 2022-02-05

## 2022-05-31 ENCOUNTER — OFFICE VISIT (OUTPATIENT)
Dept: FAMILY MEDICINE CLINIC | Facility: CLINIC | Age: 52
End: 2022-05-31
Payer: COMMERCIAL

## 2022-05-31 VITALS
TEMPERATURE: 98 F | HEIGHT: 68 IN | OXYGEN SATURATION: 98 % | SYSTOLIC BLOOD PRESSURE: 120 MMHG | BODY MASS INDEX: 35.13 KG/M2 | HEART RATE: 74 BPM | DIASTOLIC BLOOD PRESSURE: 78 MMHG | WEIGHT: 231.81 LBS

## 2022-05-31 DIAGNOSIS — Z12.4 SCREENING FOR CERVICAL CANCER: ICD-10-CM

## 2022-05-31 DIAGNOSIS — Z13.0 SCREENING FOR ENDOCRINE, NUTRITIONAL, METABOLIC AND IMMUNITY DISORDER: ICD-10-CM

## 2022-05-31 DIAGNOSIS — Z13.6 SCREENING FOR ISCHEMIC HEART DISEASE: ICD-10-CM

## 2022-05-31 DIAGNOSIS — Z12.31 BREAST CANCER SCREENING BY MAMMOGRAM: ICD-10-CM

## 2022-05-31 DIAGNOSIS — Z13.228 SCREENING FOR ENDOCRINE, NUTRITIONAL, METABOLIC AND IMMUNITY DISORDER: ICD-10-CM

## 2022-05-31 DIAGNOSIS — Z13.21 SCREENING FOR ENDOCRINE, NUTRITIONAL, METABOLIC AND IMMUNITY DISORDER: ICD-10-CM

## 2022-05-31 DIAGNOSIS — F33.1 MODERATE EPISODE OF RECURRENT MAJOR DEPRESSIVE DISORDER (HCC): ICD-10-CM

## 2022-05-31 DIAGNOSIS — Z13.29 SCREENING FOR ENDOCRINE, NUTRITIONAL, METABOLIC AND IMMUNITY DISORDER: ICD-10-CM

## 2022-05-31 DIAGNOSIS — K13.0 LIP MASS: ICD-10-CM

## 2022-05-31 DIAGNOSIS — Z23 NEED FOR VACCINATION: ICD-10-CM

## 2022-05-31 DIAGNOSIS — Z00.00 ANNUAL PHYSICAL EXAM: Primary | ICD-10-CM

## 2022-05-31 PROBLEM — Z12.11 SPECIAL SCREENING FOR MALIGNANT NEOPLASM OF COLON: Status: RESOLVED | Noted: 2022-02-05 | Resolved: 2022-05-31

## 2022-05-31 PROBLEM — Z12.11 SCREEN FOR COLON CANCER: Status: RESOLVED | Noted: 2019-09-09 | Resolved: 2022-05-31

## 2022-05-31 PROCEDURE — 88175 CYTOPATH C/V AUTO FLUID REDO: CPT | Performed by: FAMILY MEDICINE

## 2022-05-31 PROCEDURE — 87624 HPV HI-RISK TYP POOLED RSLT: CPT | Performed by: FAMILY MEDICINE

## 2022-05-31 PROCEDURE — 90471 IMMUNIZATION ADMIN: CPT | Performed by: FAMILY MEDICINE

## 2022-05-31 PROCEDURE — 99396 PREV VISIT EST AGE 40-64: CPT | Performed by: FAMILY MEDICINE

## 2022-05-31 PROCEDURE — 3008F BODY MASS INDEX DOCD: CPT | Performed by: FAMILY MEDICINE

## 2022-05-31 PROCEDURE — 3078F DIAST BP <80 MM HG: CPT | Performed by: FAMILY MEDICINE

## 2022-05-31 PROCEDURE — 3074F SYST BP LT 130 MM HG: CPT | Performed by: FAMILY MEDICINE

## 2022-05-31 PROCEDURE — 90750 HZV VACC RECOMBINANT IM: CPT | Performed by: FAMILY MEDICINE

## 2022-05-31 RX ORDER — POLYETHYLENE GLYCOL 3350, SODIUM CHLORIDE, SODIUM BICARBONATE, POTASSIUM CHLORIDE 420; 11.2; 5.72; 1.48 G/4L; G/4L; G/4L; G/4L
236 POWDER, FOR SOLUTION ORAL AS DIRECTED
COMMUNITY
Start: 2021-11-24 | End: 2022-05-31 | Stop reason: ALTCHOICE

## 2022-05-31 RX ORDER — VENLAFAXINE HYDROCHLORIDE 150 MG/1
150 CAPSULE, EXTENDED RELEASE ORAL
Qty: 90 CAPSULE | Refills: 3 | Status: SHIPPED | OUTPATIENT
Start: 2022-05-31

## 2022-06-01 LAB — HPV I/H RISK 1 DNA SPEC QL NAA+PROBE: NEGATIVE

## 2022-06-03 ENCOUNTER — HOSPITAL ENCOUNTER (OUTPATIENT)
Dept: MAMMOGRAPHY | Facility: HOSPITAL | Age: 52
Discharge: HOME | End: 2022-06-03
Attending: FAMILY MEDICINE
Payer: COMMERCIAL

## 2022-06-03 DIAGNOSIS — R92.1 BREAST CALCIFICATIONS ON MAMMOGRAM: ICD-10-CM

## 2022-06-03 PROCEDURE — 77061 BREAST TOMOSYNTHESIS UNI: CPT | Performed by: FAMILY MEDICINE

## 2022-06-03 PROCEDURE — 77065 DX MAMMO INCL CAD UNI: CPT | Performed by: FAMILY MEDICINE

## 2022-06-03 NOTE — IMAGING NOTE
This Breast Care RN assisted Dr. Lissette Chritsie with recommendation for a right breast 1 site stereotactic biopsy for calcifications. Procedure reviewed and all questions answered. Emotional and educational support given. On the day of the biopsy, pt instructed to take Tylenol 1000mg PO, eat a light meal & bring or wear a sports bra. Post biopsy care also reviewed with pt to include NO lifting more than 5lbs, no exercising or housework (limit upper body movement) for 24-48 hrs post biopsy. Patient denies blood thinners, bleeding disorders, liver disease, and chemo. Melonie Watts Pt verbalized understanding. Our breast center schedulers will be calling to schedule an appt that is convenient for pt.

## 2022-06-09 ENCOUNTER — HOSPITAL ENCOUNTER (OUTPATIENT)
Dept: MAMMOGRAPHY | Facility: HOSPITAL | Age: 52
Discharge: HOME | End: 2022-06-09
Attending: FAMILY MEDICINE
Payer: COMMERCIAL

## 2022-06-09 DIAGNOSIS — R92.2 INCONCLUSIVE MAMMOGRAM: ICD-10-CM

## 2022-06-09 PROCEDURE — 88305 TISSUE EXAM BY PATHOLOGIST: CPT | Performed by: FAMILY MEDICINE

## 2022-06-09 PROCEDURE — 88341 IMHCHEM/IMCYTCHM EA ADD ANTB: CPT | Performed by: FAMILY MEDICINE

## 2022-06-09 PROCEDURE — 88344 IMHCHEM/IMCYTCHM EA MLT ANTB: CPT | Performed by: FAMILY MEDICINE

## 2022-06-09 PROCEDURE — 88342 IMHCHEM/IMCYTCHM 1ST ANTB: CPT | Performed by: FAMILY MEDICINE

## 2022-06-09 PROCEDURE — 19081 BX BREAST 1ST LESION STRTCTC: CPT | Performed by: FAMILY MEDICINE

## 2022-06-15 ENCOUNTER — PATIENT MESSAGE (OUTPATIENT)
Dept: FAMILY MEDICINE CLINIC | Facility: CLINIC | Age: 52
End: 2022-06-15

## 2022-06-15 ENCOUNTER — TELEPHONE (OUTPATIENT)
Dept: MAMMOGRAPHY | Facility: HOSPITAL | Age: 52
End: 2022-06-15

## 2022-06-15 DIAGNOSIS — Z98.890 STATUS POST BREAST BIOPSY: ICD-10-CM

## 2022-06-15 DIAGNOSIS — D36.9 PAPILLOMA: Primary | ICD-10-CM

## 2022-06-15 NOTE — TELEPHONE ENCOUNTER
Telephoned Erlin Wang and name,  verified with patient. Notified Erlin Wang of right breast negative for malignancy but positive for papilloma biopsy result. Concordance verified by radiologist, Dr. Mariola Diaz. Charis Enrike Soliman reports biopsy site is healing well. Radiologist recommends surgical consultation. Dr Melida Almonte office is referring patient to Dr Pearlean Hammans. Patient was provided with the contact information for Dr Pearlean Hammans and instructed to call for an appointment. . Pt verbalized understanding and had no further questions at this time.

## 2022-06-15 NOTE — TELEPHONE ENCOUNTER
_______________________________________________________  Referred to Provider Information:  Provider Address Phone   MD Miguel Luo [address] [place] [address] [phone]     Intraductal breast papilloma are considered benign-it is associated with calcifications. Uncertain if this increases risk of breast cancer but radiologist recommended surgical consultation. Surgical oncologist/surgeon will determine monitoring for if any additional biopsy or lumpectomy is indicated. There is no cancer at this point. Please inform and provide referral information.

## 2022-06-18 LAB
ABSOLUTE BASOPHILS: 29 CELLS/UL (ref 0–200)
ABSOLUTE EOSINOPHILS: 49 CELLS/UL (ref 15–500)
ABSOLUTE LYMPHOCYTES: 1558 CELLS/UL (ref 850–3900)
ABSOLUTE MONOCYTES: 441 CELLS/UL (ref 200–950)
ABSOLUTE NEUTROPHILS: 2822 CELLS/UL (ref 1500–7800)
ALBUMIN/GLOBULIN RATIO: 1.6 (CALC) (ref 1–2.5)
ALBUMIN: 4.2 G/DL (ref 3.6–5.1)
ALKALINE PHOSPHATASE: 89 U/L (ref 37–153)
ALT: 43 U/L (ref 6–29)
AST: 35 U/L (ref 10–35)
BASOPHILS: 0.6 %
BILIRUBIN, TOTAL: 0.5 MG/DL (ref 0.2–1.2)
BUN: 14 MG/DL (ref 7–25)
CALCIUM: 9.7 MG/DL (ref 8.6–10.4)
CARBON DIOXIDE: 27 MMOL/L (ref 20–32)
CHLORIDE: 103 MMOL/L (ref 98–110)
CHOL/HDLC RATIO: 2.6 (CALC)
CHOLESTEROL, TOTAL: 210 MG/DL
CREATININE: 0.88 MG/DL (ref 0.5–1.05)
EGFR IF AFRICN AM: 88 ML/MIN/1.73M2
EGFR IF NONAFRICN AM: 76 ML/MIN/1.73M2
EOSINOPHILS: 1 %
GLOBULIN: 2.7 G/DL (CALC) (ref 1.9–3.7)
GLUCOSE: 100 MG/DL (ref 65–99)
HDL CHOLESTEROL: 80 MG/DL
HEMATOCRIT: 42.2 % (ref 35–45)
HEMOGLOBIN: 13.8 G/DL (ref 11.7–15.5)
LDL-CHOLESTEROL: 110 MG/DL (CALC)
LYMPHOCYTES: 31.8 %
MCH: 29.4 PG (ref 27–33)
MCHC: 32.7 G/DL (ref 32–36)
MCV: 89.8 FL (ref 80–100)
MONOCYTES: 9 %
MPV: 10.4 FL (ref 7.5–12.5)
NEUTROPHILS: 57.6 %
NON-HDL CHOLESTEROL: 130 MG/DL (CALC)
PLATELET COUNT: 285 THOUSAND/UL (ref 140–400)
POTASSIUM: 4.5 MMOL/L (ref 3.5–5.3)
PROTEIN, TOTAL: 6.9 G/DL (ref 6.1–8.1)
RDW: 12.9 % (ref 11–15)
RED BLOOD CELL COUNT: 4.7 MILLION/UL (ref 3.8–5.1)
SODIUM: 139 MMOL/L (ref 135–146)
TRIGLYCERIDES: 98 MG/DL
TSH W/REFLEX TO FT4: 1.1 MIU/L
WHITE BLOOD CELL COUNT: 4.9 THOUSAND/UL (ref 3.8–10.8)

## 2022-06-27 PROBLEM — R74.01 ELEVATED ALT MEASUREMENT: Status: ACTIVE | Noted: 2022-06-27

## 2022-07-29 ENCOUNTER — OFFICE VISIT (OUTPATIENT)
Dept: SURGERY | Facility: CLINIC | Age: 52
End: 2022-07-29
Payer: COMMERCIAL

## 2022-07-29 DIAGNOSIS — K13.0 LIP MASS: Primary | ICD-10-CM

## 2022-07-29 PROCEDURE — 99203 OFFICE O/P NEW LOW 30 MIN: CPT | Performed by: SURGERY

## 2022-07-29 RX ORDER — LORATADINE 10 MG/1
10 TABLET ORAL DAILY
COMMUNITY

## 2022-07-29 RX ORDER — SWAB
SWAB, NON-MEDICATED MISCELLANEOUS
COMMUNITY

## 2022-08-07 ENCOUNTER — PATIENT MESSAGE (OUTPATIENT)
Dept: FAMILY MEDICINE CLINIC | Facility: CLINIC | Age: 52
End: 2022-08-07

## 2022-08-08 NOTE — TELEPHONE ENCOUNTER
From: Alexsandra Lainez  To: Clint Melendrez DO  Sent: 8/7/2022 1:56 PM CDT  Subject: Updated Covid Vaccine Card 2nd Booster    Hi just wanted to send my updated Covid vaccine card as I got my second booster shot. Thanks!

## 2022-08-12 ENCOUNTER — OFFICE VISIT (OUTPATIENT)
Dept: SURGERY | Facility: CLINIC | Age: 52
End: 2022-08-12
Payer: COMMERCIAL

## 2022-08-12 ENCOUNTER — TELEPHONE (OUTPATIENT)
Dept: SURGERY | Facility: CLINIC | Age: 52
End: 2022-08-12

## 2022-08-12 DIAGNOSIS — K13.0 LIP MASS: Primary | ICD-10-CM

## 2022-08-12 PROCEDURE — 88305 TISSUE EXAM BY PATHOLOGIST: CPT | Performed by: SURGERY

## 2022-08-12 RX ORDER — CEPHALEXIN 500 MG/1
500 CAPSULE ORAL 4 TIMES DAILY
Qty: 28 CAPSULE | Refills: 0 | Status: SHIPPED | OUTPATIENT
Start: 2022-08-12 | End: 2022-08-19

## 2022-08-12 NOTE — TELEPHONE ENCOUNTER
LVM to discuss ABX that was ordered after patients procedure in the office today with Dr. Lebron Jimenez.

## 2022-10-05 ENCOUNTER — TELEPHONE (OUTPATIENT)
Dept: FAMILY MEDICINE CLINIC | Facility: CLINIC | Age: 52
End: 2022-10-05

## 2022-10-05 NOTE — TELEPHONE ENCOUNTER
Pt calling to report positive covid test  symptom onset 10/04/2022 and tested positive for covid today   Pt has c/o Sore throat, runny nose, ear pain right side, intermittent dry cough, T max 99.3   Denies, HA, loss of taste of or smell, no chest pain no SOB, no body aches   Friend who is a nurse recommended Vit C and D, which she has been taking  Her friend also recommended baby Aspirin and Pepcid which she has not started       CDC guidelines discussed with pt, pt voiced understanding   Advised not to take aspirin or Pepcid as her symptoms do not require for at at this time. Vitamin C and D may be supportive care but optional if she wants to continue. May also try OTC Mucinex DM and tylenol or ibuprofen as needed for fevers. Pt offered virtual appointment and declined at this time. Will continue supportive care at home( increased fluids, rest, and healthy meals) and will call is symptoms worsen.   Pt aware approved treatments for Covid need to be initiated within first 5 days from symptom onset, if she decides that she may want to try treatment then she will need to schedule virtual apt for prescriptions     Pt vu of all above, denies question or concerns at this time

## 2022-10-07 ENCOUNTER — TELEPHONE (OUTPATIENT)
Dept: FAMILY MEDICINE CLINIC | Facility: CLINIC | Age: 52
End: 2022-10-07

## 2022-10-07 NOTE — TELEPHONE ENCOUNTER
Incoming fax from [facility] for reevaluation of duration of antidepressant therapy     States the patient has received Venlafaxine HCL ER for more than 11 months and may be a candidate for discontinuation. Guidelines for the treatment of major depressive disorder suggest that after the patient ha been treated in the acute phase and has completed the continuation phase of treatment the anti depressant may be tapered or discontinued.     After evaluating the overall treatment goals for your patient and if medically appropriate please consider:    Tapering and or discontinuing the antidepressant over several weeks( with appropriate monitoring)     Sent to provider as Kezia Duong

## 2022-10-11 NOTE — TELEPHONE ENCOUNTER
Patient has appointment on   Future Appointments   Date Time Provider Tamara Reddy   10/25/2022  3:30 PM Jaguar Chapman DO [facility] [place]   12/1/2022  4:45 PM [facility] 30 NURSE [facility] [place]     Closing encounter

## 2022-10-11 NOTE — TELEPHONE ENCOUNTER
Please call patient to schedule an office visit in the next month to discuss if she would like to attempt to wean venlafaxine.     Thank you

## 2022-10-25 ENCOUNTER — OFFICE VISIT (OUTPATIENT)
Dept: FAMILY MEDICINE CLINIC | Facility: CLINIC | Age: 52
End: 2022-10-25
Payer: COMMERCIAL

## 2022-10-25 VITALS
RESPIRATION RATE: 18 BRPM | OXYGEN SATURATION: 99 % | HEART RATE: 77 BPM | DIASTOLIC BLOOD PRESSURE: 72 MMHG | SYSTOLIC BLOOD PRESSURE: 122 MMHG | BODY MASS INDEX: 35 KG/M2 | TEMPERATURE: 97 F | WEIGHT: 232.19 LBS

## 2022-10-25 DIAGNOSIS — Z23 NEED FOR VACCINATION: ICD-10-CM

## 2022-10-25 DIAGNOSIS — M25.552 CHRONIC LEFT HIP PAIN: Primary | ICD-10-CM

## 2022-10-25 DIAGNOSIS — G89.29 CHRONIC LEFT HIP PAIN: Primary | ICD-10-CM

## 2022-10-25 PROCEDURE — 90471 IMMUNIZATION ADMIN: CPT | Performed by: FAMILY MEDICINE

## 2022-10-25 PROCEDURE — 99213 OFFICE O/P EST LOW 20 MIN: CPT | Performed by: FAMILY MEDICINE

## 2022-10-25 PROCEDURE — 90472 IMMUNIZATION ADMIN EACH ADD: CPT | Performed by: FAMILY MEDICINE

## 2022-10-25 PROCEDURE — 90750 HZV VACC RECOMBINANT IM: CPT | Performed by: FAMILY MEDICINE

## 2022-10-25 PROCEDURE — 3078F DIAST BP <80 MM HG: CPT | Performed by: FAMILY MEDICINE

## 2022-10-25 PROCEDURE — 3074F SYST BP LT 130 MM HG: CPT | Performed by: FAMILY MEDICINE

## 2022-10-25 PROCEDURE — 90686 IIV4 VACC NO PRSV 0.5 ML IM: CPT | Performed by: FAMILY MEDICINE

## 2022-10-25 RX ORDER — CELECOXIB 200 MG/1
200 CAPSULE ORAL 2 TIMES DAILY
Qty: 360 CAPSULE | Refills: 1 | Status: SHIPPED | OUTPATIENT
Start: 2022-10-25

## 2022-12-01 ENCOUNTER — HOSPITAL ENCOUNTER (OUTPATIENT)
Dept: GENERAL RADIOLOGY | Age: 52
Discharge: HOME | End: 2022-12-01
Attending: FAMILY MEDICINE
Payer: COMMERCIAL

## 2022-12-01 DIAGNOSIS — M25.552 CHRONIC LEFT HIP PAIN: ICD-10-CM

## 2022-12-01 DIAGNOSIS — G89.29 CHRONIC LEFT HIP PAIN: ICD-10-CM

## 2022-12-01 PROCEDURE — 73502 X-RAY EXAM HIP UNI 2-3 VIEWS: CPT | Performed by: FAMILY MEDICINE

## 2023-10-16 ENCOUNTER — OFFICE VISIT (OUTPATIENT)
Dept: FAMILY MEDICINE CLINIC | Facility: CLINIC | Age: 53
End: 2023-10-16
Payer: COMMERCIAL

## 2023-10-16 VITALS
DIASTOLIC BLOOD PRESSURE: 80 MMHG | TEMPERATURE: 97 F | RESPIRATION RATE: 16 BRPM | BODY MASS INDEX: 36.32 KG/M2 | HEIGHT: 68 IN | WEIGHT: 239.63 LBS | SYSTOLIC BLOOD PRESSURE: 128 MMHG | HEART RATE: 89 BPM | OXYGEN SATURATION: 97 %

## 2023-10-16 DIAGNOSIS — Z13.0 SCREENING FOR ENDOCRINE, NUTRITIONAL, METABOLIC AND IMMUNITY DISORDER: ICD-10-CM

## 2023-10-16 DIAGNOSIS — Z13.29 SCREENING FOR ENDOCRINE, NUTRITIONAL, METABOLIC AND IMMUNITY DISORDER: ICD-10-CM

## 2023-10-16 DIAGNOSIS — Z12.83 SCREENING EXAM FOR SKIN CANCER: ICD-10-CM

## 2023-10-16 DIAGNOSIS — Z00.00 ANNUAL PHYSICAL EXAM: Primary | ICD-10-CM

## 2023-10-16 DIAGNOSIS — K13.0 LIP MASS: ICD-10-CM

## 2023-10-16 DIAGNOSIS — Z13.228 SCREENING FOR ENDOCRINE, NUTRITIONAL, METABOLIC AND IMMUNITY DISORDER: ICD-10-CM

## 2023-10-16 DIAGNOSIS — E78.00 PURE HYPERCHOLESTEROLEMIA: ICD-10-CM

## 2023-10-16 DIAGNOSIS — F33.42 RECURRENT MAJOR DEPRESSIVE DISORDER, IN FULL REMISSION (HCC): ICD-10-CM

## 2023-10-16 DIAGNOSIS — Z12.31 BREAST CANCER SCREENING BY MAMMOGRAM: ICD-10-CM

## 2023-10-16 DIAGNOSIS — Z23 NEED FOR VACCINATION: ICD-10-CM

## 2023-10-16 DIAGNOSIS — Z13.21 SCREENING FOR ENDOCRINE, NUTRITIONAL, METABOLIC AND IMMUNITY DISORDER: ICD-10-CM

## 2023-10-16 PROBLEM — R92.1 BREAST CALCIFICATIONS ON MAMMOGRAM: Status: RESOLVED | Noted: 2021-12-01 | Resolved: 2023-10-16

## 2023-10-16 PROCEDURE — 99396 PREV VISIT EST AGE 40-64: CPT | Performed by: FAMILY MEDICINE

## 2023-10-16 PROCEDURE — 3074F SYST BP LT 130 MM HG: CPT | Performed by: FAMILY MEDICINE

## 2023-10-16 PROCEDURE — 3079F DIAST BP 80-89 MM HG: CPT | Performed by: FAMILY MEDICINE

## 2023-10-16 PROCEDURE — 3008F BODY MASS INDEX DOCD: CPT | Performed by: FAMILY MEDICINE

## 2023-10-16 RX ORDER — VENLAFAXINE HYDROCHLORIDE 150 MG/1
150 CAPSULE, EXTENDED RELEASE ORAL
Qty: 90 CAPSULE | Refills: 3 | Status: SHIPPED | OUTPATIENT
Start: 2023-10-16

## 2023-10-26 ENCOUNTER — TELEPHONE (OUTPATIENT)
Dept: FAMILY MEDICINE CLINIC | Facility: CLINIC | Age: 53
End: 2023-10-26

## 2023-10-26 DIAGNOSIS — Z12.31 BREAST CANCER SCREENING BY MAMMOGRAM: ICD-10-CM

## 2023-10-26 DIAGNOSIS — D36.9 INTRADUCTAL PAPILLOMA: Primary | ICD-10-CM

## 2023-10-26 NOTE — TELEPHONE ENCOUNTER
Referred to Provider Information:  Provider Address Phone   MD Miguel Shell [address] [phone]   Patient had intraductal papilloma of right breast which is considered precancerous. Please call her and have her complete the mammogram and ultrasound and she needs to follow-up with Dr. Craig Robles. Many times they recommend removing these surgically. Radiologist reviewed case and recommended she have a diagnostic mammogram since she was told by radiology last year to see surgical oncology. Please inform. She will need to schedule the ultrasound of the breast as well.   Per radiologist recommendations

## 2023-10-26 NOTE — TELEPHONE ENCOUNTER
[facility] called, states pt has appt for screening mammo on 10/28/2023, however, pt had biopsy last year and was suppose to f/u with surgery. They states pt never f/u with surgery, so radiologist is recommending diagnostic mammogram instead of screening mammogram.    Diagnostic bilateral mammogram with ultrasound order pended for provider review and signature.

## 2023-10-28 LAB
ABSOLUTE BASOPHILS: 31 CELLS/UL (ref 0–200)
ABSOLUTE EOSINOPHILS: 42 CELLS/UL (ref 15–500)
ABSOLUTE LYMPHOCYTES: 1539 CELLS/UL (ref 850–3900)
ABSOLUTE MONOCYTES: 468 CELLS/UL (ref 200–950)
ABSOLUTE NEUTROPHILS: 3120 CELLS/UL (ref 1500–7800)
ALBUMIN/GLOBULIN RATIO: 1.4 (CALC) (ref 1–2.5)
ALBUMIN: 3.9 G/DL (ref 3.6–5.1)
ALKALINE PHOSPHATASE: 81 U/L (ref 37–153)
ALT: 31 U/L (ref 6–29)
AST: 25 U/L (ref 10–35)
BASOPHILS: 0.6 %
BILIRUBIN, TOTAL: 0.4 MG/DL (ref 0.2–1.2)
BUN: 12 MG/DL (ref 7–25)
CALCIUM: 8.9 MG/DL (ref 8.6–10.4)
CARBON DIOXIDE: 24 MMOL/L (ref 20–32)
CHLORIDE: 104 MMOL/L (ref 98–110)
CHOL/HDLC RATIO: 2.6 (CALC)
CHOLESTEROL, TOTAL: 187 MG/DL
CREATININE: 0.73 MG/DL (ref 0.5–1.03)
EGFR: 98 ML/MIN/1.73M2
EOSINOPHILS: 0.8 %
GLOBULIN: 2.8 G/DL (CALC) (ref 1.9–3.7)
GLUCOSE: 99 MG/DL (ref 65–99)
HDL CHOLESTEROL: 71 MG/DL
HEMATOCRIT: 41.3 % (ref 35–45)
HEMOGLOBIN A1C: 5.4 % OF TOTAL HGB
HEMOGLOBIN: 13.5 G/DL (ref 11.7–15.5)
LDL-CHOLESTEROL: 99 MG/DL (CALC)
LYMPHOCYTES: 29.6 %
MCH: 28.7 PG (ref 27–33)
MCHC: 32.7 G/DL (ref 32–36)
MCV: 87.9 FL (ref 80–100)
MONOCYTES: 9 %
MPV: 11.2 FL (ref 7.5–12.5)
NEUTROPHILS: 60 %
NON-HDL CHOLESTEROL: 116 MG/DL (CALC)
PLATELET COUNT: 273 THOUSAND/UL (ref 140–400)
POTASSIUM: 4.1 MMOL/L (ref 3.5–5.3)
PROTEIN, TOTAL: 6.7 G/DL (ref 6.1–8.1)
RDW: 13.3 % (ref 11–15)
RED BLOOD CELL COUNT: 4.7 MILLION/UL (ref 3.8–5.1)
SODIUM: 137 MMOL/L (ref 135–146)
TRIGLYCERIDES: 82 MG/DL
TSH W/REFLEX TO FT4: 1.11 MIU/L
WHITE BLOOD CELL COUNT: 5.2 THOUSAND/UL (ref 3.8–10.8)

## 2023-11-07 ENCOUNTER — HOSPITAL ENCOUNTER (OUTPATIENT)
Dept: MAMMOGRAPHY | Facility: HOSPITAL | Age: 53
Discharge: HOME | End: 2023-11-07
Attending: FAMILY MEDICINE
Payer: COMMERCIAL

## 2023-11-07 DIAGNOSIS — D36.9 INTRADUCTAL PAPILLOMA: ICD-10-CM

## 2023-11-07 PROCEDURE — 76642 ULTRASOUND BREAST LIMITED: CPT | Performed by: FAMILY MEDICINE

## 2023-11-07 PROCEDURE — 77062 BREAST TOMOSYNTHESIS BI: CPT | Performed by: FAMILY MEDICINE

## 2023-11-07 PROCEDURE — 77066 DX MAMMO INCL CAD BI: CPT | Performed by: FAMILY MEDICINE

## 2023-12-05 ENCOUNTER — OFFICE VISIT (OUTPATIENT)
Dept: SURGERY | Facility: CLINIC | Age: 53
End: 2023-12-05
Payer: COMMERCIAL

## 2023-12-05 VITALS
OXYGEN SATURATION: 97 % | TEMPERATURE: 98 F | BODY MASS INDEX: 36.92 KG/M2 | SYSTOLIC BLOOD PRESSURE: 138 MMHG | RESPIRATION RATE: 18 BRPM | HEIGHT: 68 IN | DIASTOLIC BLOOD PRESSURE: 86 MMHG | WEIGHT: 243.63 LBS | HEART RATE: 79 BPM

## 2023-12-05 DIAGNOSIS — D24.1 INTRADUCTAL PAPILLOMA OF RIGHT BREAST: Primary | ICD-10-CM

## 2023-12-05 PROCEDURE — 3075F SYST BP GE 130 - 139MM HG: CPT | Performed by: SURGERY

## 2023-12-05 PROCEDURE — 3008F BODY MASS INDEX DOCD: CPT | Performed by: SURGERY

## 2023-12-05 PROCEDURE — 3079F DIAST BP 80-89 MM HG: CPT | Performed by: SURGERY

## 2023-12-05 PROCEDURE — 99204 OFFICE O/P NEW MOD 45 MIN: CPT | Performed by: SURGERY

## 2023-12-05 NOTE — PATIENT INSTRUCTIONS
Dr. Rossy Knapp  Tel: [phone]  Fax: [address] [facility] [address] [place], [address] [facility]  [phone]     Surgery/Procedure: Right breast wire localized excisional biopsy     Anesthesia:   MAC  Surgery Length:   45 minutes CPT:  72958   Wire LOC:   Yes Nuc Med:   No   Breonna Seed:  No       Dx & ICD-10: Intraductal papilloma of right breast (D24.1)   Radiology Instructions: Right breast, 2 o'clock position, Q shaped clip, biopsy demonstrates intraductal papilloma.    _______________________________________________________________________________    Someone must accompany you the day of the procedure to drive you home safely, because of anesthesia. You will need an adult  to stay with you the first night following your surgery. You must remove any kind of makeup, acrylic nails, lotions, powders, creams or deodorant. [place] ONLY: Pre-admission will give instruct you on when to take Gatorade and Tylenol/acetaminophen prior to your surgery, purchase 2 - 12oz bottles of regular Gatorade (NOT RED/SUGAR FREE). Otherwise, you may not eat or drink anything else after 11PM the night before surgery. [place] ONLY: You may not eat or drink anything after midnight the day of your surgery. Wear comfortable clothing that can be easily removed. If you wear dentures, contacts lenses, or any prosthesis, you will be asked to remove them. Do not drink alcohol or smoke 24 hours prior to your procedure. Bring a picture ID and your insurance card. Covid-19 testing is no longer required before surgery unless you are experiencing symptoms such as fever, cough, congestion, etc.   The Pre-Admission Testing Department will call the day before to confirm your procedure, give you the time you need to arrive by and directions on where to go. They begin making calls after 2pm, if you are not contacted by 4pm, please call the surgeon's office listed above.   Do not take any blood thinners at least one week prior to the procedure/surgery. This includes aspirin, baby aspirin, Ibuprofen products, herbal supplements, diet medications, vitamin E, fish oil and green tea supplements. Please check other supplements for these ingredients. *TYLENOL or acetaminophen is acceptable*  If you take Coumadin, Plavix, Xarelto, or Eliquis, please contact your prescribing physician for special instructions on how long to hold. If you take insulin contact your primary care physician for special instructions. Our surgery scheduler, Karl Escalante, will be contacting you to discuss surgery dates. If you have any questions related to scheduling your surgery, please reach out to her at [phone].  _____________________________________________________________________  PRE-OPERATIVE TESTING IF INDICATED BELOW  PLEASE COMPLETE ASAP (AT LEAST 14-21 DAYS PRIOR TO SURGERY)  [] CBC [] BMP [] CMP [] EKG    [] PT, PTT, INR [] Cardiac Clearance  [] H&P Medical Clearance [] Chest X-ray     Please call Central Scheduling to schedule an appointment for pre-operative labs/tests @ [phone]  Does the patient have a pacemaker or ICD? Does the patient have sleep apnea?   [] Yes   [x] No                               [x] Yes   [] No

## 2023-12-07 PROBLEM — D24.1 INTRADUCTAL PAPILLOMA OF RIGHT BREAST: Status: ACTIVE | Noted: 2023-12-07

## 2023-12-08 ENCOUNTER — TELEPHONE (OUTPATIENT)
Dept: SURGERY | Facility: CLINIC | Age: 53
End: 2023-12-08

## 2023-12-08 DIAGNOSIS — D24.1 INTRADUCTAL PAPILLOMA OF RIGHT BREAST: Primary | ICD-10-CM

## 2023-12-08 NOTE — TELEPHONE ENCOUNTER
Calling pt in regards to scheduling surgery. Informed pt that I have 01/26/2024 available at [facility] with Dr. Natalya Acevedo. Pt verbalized understanding and in agreement with date and location. All questions answered. Encouraged pt to call or [employer] message office with any other questions or concerns.

## 2023-12-26 ENCOUNTER — TELEPHONE (OUTPATIENT)
Dept: MAMMOGRAPHY | Facility: HOSPITAL | Age: 53
End: 2023-12-26

## 2023-12-29 ENCOUNTER — TELEPHONE (OUTPATIENT)
Dept: MAMMOGRAPHY | Facility: HOSPITAL | Age: 53
End: 2023-12-29

## 2024-01-02 ENCOUNTER — TELEPHONE (OUTPATIENT)
Dept: GENERAL RADIOLOGY | Facility: HOSPITAL | Age: 54
End: 2024-01-02

## 2024-01-02 NOTE — TELEPHONE ENCOUNTER
1045: Spoke with Maria Dolores Soliman   Introduced myself as one of the breast nurse coordinators and informed Ms. Soliman of the purpose of my call.   Discussed localization procedure to be done in the women's imaging center prior to surgery Friday, January 26.   Procedure and flow of the day reviewed. Ms. Soliman verbalized understanding. No questions at this time   Encouraged Ms. Soliman to contact the breast center or Dr. Stock's office if she has questions or concerns prior to her surgery date.  Maria Dolores Soliman verbalized agreement and gratitude for the call.

## 2024-01-03 ENCOUNTER — TELEPHONE (OUTPATIENT)
Dept: SURGERY | Facility: CLINIC | Age: 54
End: 2024-01-03

## 2024-01-03 NOTE — TELEPHONE ENCOUNTER
Called patient informed her we had a opening for surgery with Dr. Stock for 01/4 patient declined

## 2024-01-26 ENCOUNTER — HOSPITAL ENCOUNTER (OUTPATIENT)
Facility: HOSPITAL | Age: 54
Setting detail: HOSPITAL OUTPATIENT SURGERY
Discharge: HOME | End: 2024-01-26
Attending: SURGERY | Admitting: SURGERY
Payer: COMMERCIAL

## 2024-01-26 ENCOUNTER — HOSPITAL ENCOUNTER (OUTPATIENT)
Dept: MAMMOGRAPHY | Facility: HOSPITAL | Age: 54
Discharge: HOME | End: 2024-01-26
Attending: SURGERY | Admitting: SURGERY
Payer: COMMERCIAL

## 2024-01-26 ENCOUNTER — ANESTHESIA EVENT (OUTPATIENT)
Dept: SURGERY | Facility: HOSPITAL | Age: 54
End: 2024-01-26
Payer: COMMERCIAL

## 2024-01-26 ENCOUNTER — ANESTHESIA (OUTPATIENT)
Dept: SURGERY | Facility: HOSPITAL | Age: 54
End: 2024-01-26
Payer: COMMERCIAL

## 2024-01-26 VITALS
SYSTOLIC BLOOD PRESSURE: 147 MMHG | TEMPERATURE: 97 F | HEIGHT: 68 IN | BODY MASS INDEX: 36.42 KG/M2 | OXYGEN SATURATION: 100 % | RESPIRATION RATE: 16 BRPM | DIASTOLIC BLOOD PRESSURE: 80 MMHG | HEART RATE: 75 BPM | WEIGHT: 240.31 LBS

## 2024-01-26 DIAGNOSIS — D24.1 INTRADUCTAL PAPILLOMA OF RIGHT BREAST: ICD-10-CM

## 2024-01-26 PROCEDURE — 76098 X-RAY EXAM SURGICAL SPECIMEN: CPT | Performed by: SURGERY

## 2024-01-26 PROCEDURE — 19281 PERQ DEVICE BREAST 1ST IMAG: CPT | Performed by: SURGERY

## 2024-01-26 PROCEDURE — 0HBT0ZZ EXCISION OF RIGHT BREAST, OPEN APPROACH: ICD-10-PCS | Performed by: SURGERY

## 2024-01-26 PROCEDURE — 88307 TISSUE EXAM BY PATHOLOGIST: CPT | Performed by: SURGERY

## 2024-01-26 RX ORDER — ACETAMINOPHEN 500 MG
1000 TABLET ORAL ONCE AS NEEDED
Status: DISCONTINUED | OUTPATIENT
Start: 2024-01-26 | End: 2024-01-26

## 2024-01-26 RX ORDER — MEPERIDINE HYDROCHLORIDE 25 MG/ML
25 INJECTION INTRAMUSCULAR; INTRAVENOUS; SUBCUTANEOUS AS NEEDED
Status: DISCONTINUED | OUTPATIENT
Start: 2024-01-26 | End: 2024-01-26

## 2024-01-26 RX ORDER — DIPHENHYDRAMINE HYDROCHLORIDE 50 MG/ML
12.5 INJECTION INTRAMUSCULAR; INTRAVENOUS AS NEEDED
Status: DISCONTINUED | OUTPATIENT
Start: 2024-01-26 | End: 2024-01-26

## 2024-01-26 RX ORDER — LIDOCAINE HYDROCHLORIDE AND EPINEPHRINE 10; 10 MG/ML; UG/ML
INJECTION, SOLUTION INFILTRATION; PERINEURAL AS NEEDED
Status: DISCONTINUED | OUTPATIENT
Start: 2024-01-26 | End: 2024-01-26 | Stop reason: HOSPADM

## 2024-01-26 RX ORDER — BUPIVACAINE HYDROCHLORIDE 5 MG/ML
INJECTION, SOLUTION EPIDURAL; INTRACAUDAL AS NEEDED
Status: DISCONTINUED | OUTPATIENT
Start: 2024-01-26 | End: 2024-01-26 | Stop reason: HOSPADM

## 2024-01-26 RX ORDER — SCOLOPAMINE TRANSDERMAL SYSTEM 1 MG/1
1 PATCH, EXTENDED RELEASE TRANSDERMAL ONCE
Status: DISCONTINUED | OUTPATIENT
Start: 2024-01-26 | End: 2024-01-26 | Stop reason: HOSPADM

## 2024-01-26 RX ORDER — HYDROCODONE BITARTRATE AND ACETAMINOPHEN 5; 325 MG/1; MG/1
1-2 TABLET ORAL EVERY 6 HOURS PRN
Qty: 20 TABLET | Refills: 0 | Status: SHIPPED | OUTPATIENT
Start: 2024-01-26

## 2024-01-26 RX ORDER — SODIUM CHLORIDE, SODIUM LACTATE, POTASSIUM CHLORIDE, CALCIUM CHLORIDE 600; 310; 30; 20 MG/100ML; MG/100ML; MG/100ML; MG/100ML
INJECTION, SOLUTION INTRAVENOUS CONTINUOUS
Status: DISCONTINUED | OUTPATIENT
Start: 2024-01-26 | End: 2024-01-26

## 2024-01-26 RX ORDER — MORPHINE SULFATE 4 MG/ML
2 INJECTION, SOLUTION INTRAMUSCULAR; INTRAVENOUS EVERY 5 MIN PRN
Status: DISCONTINUED | OUTPATIENT
Start: 2024-01-26 | End: 2024-01-26

## 2024-01-26 RX ORDER — MIDAZOLAM HYDROCHLORIDE 1 MG/ML
1 INJECTION INTRAMUSCULAR; INTRAVENOUS EVERY 5 MIN PRN
Status: DISCONTINUED | OUTPATIENT
Start: 2024-01-26 | End: 2024-01-26

## 2024-01-26 RX ORDER — ONDANSETRON 2 MG/ML
4 INJECTION INTRAMUSCULAR; INTRAVENOUS EVERY 6 HOURS PRN
Status: DISCONTINUED | OUTPATIENT
Start: 2024-01-26 | End: 2024-01-26

## 2024-01-26 RX ORDER — NALOXONE HYDROCHLORIDE 0.4 MG/ML
0.08 INJECTION, SOLUTION INTRAMUSCULAR; INTRAVENOUS; SUBCUTANEOUS AS NEEDED
Status: DISCONTINUED | OUTPATIENT
Start: 2024-01-26 | End: 2024-01-26

## 2024-01-26 RX ORDER — MORPHINE SULFATE 4 MG/ML
6 INJECTION, SOLUTION INTRAMUSCULAR; INTRAVENOUS EVERY 5 MIN PRN
Status: DISCONTINUED | OUTPATIENT
Start: 2024-01-26 | End: 2024-01-26

## 2024-01-26 RX ORDER — MIDAZOLAM HYDROCHLORIDE 1 MG/ML
INJECTION INTRAMUSCULAR; INTRAVENOUS AS NEEDED
Status: DISCONTINUED | OUTPATIENT
Start: 2024-01-26 | End: 2024-01-26 | Stop reason: SURG

## 2024-01-26 RX ORDER — ACETAMINOPHEN 500 MG
1000 TABLET ORAL ONCE
Status: DISCONTINUED | OUTPATIENT
Start: 2024-01-26 | End: 2024-01-26 | Stop reason: HOSPADM

## 2024-01-26 RX ORDER — DIAZEPAM 5 MG/1
5 TABLET ORAL AS NEEDED
Status: DISCONTINUED | OUTPATIENT
Start: 2024-01-26 | End: 2024-01-26 | Stop reason: HOSPADM

## 2024-01-26 RX ORDER — MORPHINE SULFATE 4 MG/ML
4 INJECTION, SOLUTION INTRAMUSCULAR; INTRAVENOUS EVERY 5 MIN PRN
Status: DISCONTINUED | OUTPATIENT
Start: 2024-01-26 | End: 2024-01-26

## 2024-01-26 RX ORDER — PROCHLORPERAZINE EDISYLATE 5 MG/ML
5 INJECTION INTRAMUSCULAR; INTRAVENOUS EVERY 8 HOURS PRN
Status: DISCONTINUED | OUTPATIENT
Start: 2024-01-26 | End: 2024-01-26

## 2024-01-26 RX ORDER — CEFAZOLIN SODIUM/WATER 2 G/20 ML
2 SYRINGE (ML) INTRAVENOUS ONCE
Status: COMPLETED | OUTPATIENT
Start: 2024-01-26 | End: 2024-01-26

## 2024-01-26 RX ORDER — HYDROCODONE BITARTRATE AND ACETAMINOPHEN 5; 325 MG/1; MG/1
2 TABLET ORAL ONCE AS NEEDED
Status: DISCONTINUED | OUTPATIENT
Start: 2024-01-26 | End: 2024-01-26

## 2024-01-26 RX ORDER — LIDOCAINE HYDROCHLORIDE 10 MG/ML
INJECTION, SOLUTION EPIDURAL; INFILTRATION; INTRACAUDAL; PERINEURAL AS NEEDED
Status: DISCONTINUED | OUTPATIENT
Start: 2024-01-26 | End: 2024-01-26 | Stop reason: SURG

## 2024-01-26 RX ORDER — HYDROCODONE BITARTRATE AND ACETAMINOPHEN 5; 325 MG/1; MG/1
1 TABLET ORAL ONCE AS NEEDED
Status: DISCONTINUED | OUTPATIENT
Start: 2024-01-26 | End: 2024-01-26

## 2024-01-26 RX ADMIN — SODIUM CHLORIDE, SODIUM LACTATE, POTASSIUM CHLORIDE, CALCIUM CHLORIDE: 600; 310; 30; 20 INJECTION, SOLUTION INTRAVENOUS at 13:04:00

## 2024-01-26 RX ADMIN — SODIUM CHLORIDE, SODIUM LACTATE, POTASSIUM CHLORIDE, CALCIUM CHLORIDE: 600; 310; 30; 20 INJECTION, SOLUTION INTRAVENOUS at 12:28:00

## 2024-01-26 RX ADMIN — LIDOCAINE HYDROCHLORIDE 5 ML: 10 INJECTION, SOLUTION EPIDURAL; INFILTRATION; INTRACAUDAL; PERINEURAL at 12:34:00

## 2024-01-26 RX ADMIN — CEFAZOLIN SODIUM/WATER 2 G: 2 G/20 ML SYRINGE (ML) INTRAVENOUS at 12:38:00

## 2024-01-26 RX ADMIN — MIDAZOLAM HYDROCHLORIDE 2 MG: 1 INJECTION INTRAMUSCULAR; INTRAVENOUS at 12:30:00

## 2024-01-26 NOTE — ANESTHESIA POSTPROCEDURE EVALUATION
[facility]    Maria Dolores Soliman Patient Status:  Hospital Outpatient Surgery   Age/Gender 53 year old female MRN [id number]   Location [facility] SURGERY Attending Kristi Stock MD   Hosp Day # 0 PCP Rubi Hernadez DO       Anesthesia Post-op Note    Right breast wire localized excisional biopsy    Procedure Summary       Date: 01/26/24 Room / Location:  MAIN OR [address] [place] [address] MAIN OR    Anesthesia Start: 1228 Anesthesia Stop: 1311    Procedure: Right breast wire localized excisional biopsy (Right) Diagnosis:       Intraductal papilloma of right breast      (Intraductal papilloma of right breast [D24.1])    Surgeons: Kristi Stock MD Anesthesiologist: Oneil Jimenez MD    Anesthesia Type: MAC ASA Status: 2            Anesthesia Type: MAC    Vitals Value Taken Time   /75 01/26/24 1312   Temp 97 01/26/24 1312   Pulse 85 01/26/24 1312   Resp 16 01/26/24 1312   SpO2 100% 01/26/24 1312       Patient Location: Same Day Surgery    Anesthesia Type: MAC    Airway Patency: patent and extubated    Postop Pain Control: adequate    Mental Status: preanesthetic baseline    Nausea/Vomiting: none    Cardiopulmonary/Hydration status: stable euvolemic    Complications: no apparent anesthesia related complications    Postop vital signs: stable    Dental Exam: Unchanged from Preop    Patient to be discharged from PACU when criteria met.

## 2024-01-26 NOTE — IMAGING NOTE
Assisted Dr.Nimesh Gandara with mammography guided needle localization of the right breast.   Maria Dolores Soliman identified with spelling of name and date of birth.   Medications and allergies reviewed. NKDA reported.    History: Intraductal papilloma of right breast   Surgery: Right breast wire localized excisional biopsy     Order verified.  Procedure explained and questions answered. Maria Dolores Soliman verbalized understanding and agreement.  1041: Written consent obtained by the imaging staff.    1042: Scans taken by Mayda Ernst -mammography technologists    1045: Site prepped in a sterile manner.     1046: Dr. Gandara  present    1046: Time out complete.    1047: Lidocaine administered for anesthetic affect.  1047: Godoy 20G x 5cm needle placed- Right breast, 2 o'clock position, Q shaped clip, biopsy demonstrates intraductal papilloma.   Emotional support provided.  Maria Dolores Soliman tolerated procedure well.     Site cleaned.  Wire secured with blue clip, steri strips, sterile 4x4 gauze dressing, and Tegaderm.    Maria Dolores Soliman transported via wheelchair to pre-op/surgery holding in stable condition. Ms. Soliman  without complaints or concerns at this time.

## 2024-01-26 NOTE — ANESTHESIA PREPROCEDURE EVALUATION
PRE-OP EVALUATION    Patient Name: Maria Dolores Soliman    Admit Diagnosis: Intraductal papilloma of right breast [D24.1]    Pre-op Diagnosis: Intraductal papilloma of right breast [D24.1]    Right breast wire localized excisional biopsy    Anesthesia Procedure: Right breast wire localized excisional biopsy (Right)    Surgeon(s) and Role:     * Kristi Stock MD - Primary    Pre-op vitals reviewed.  Temp: 98.8 °F (37.1 °C)  Pulse: 78  Resp: 16  BP: 140/86  SpO2: 97 %  Body mass index is 36.54 kg/m².    Current medications reviewed.  Hospital Medications:   [MAR Hold] acetaminophen (Tylenol Extra Strength) tab 1,000 mg  1,000 mg Oral Once    [MAR Hold] scopolamine (Transderm-Scop) 1 MG/3DAYS patch 1 patch  1 patch Transdermal Once    lactated ringers infusion   Intravenous Continuous    ceFAZolin (Ancef) 2 g in 20mL IV syringe premix  2 g Intravenous Once    [MAR Hold] diazePAM (Valium) tab 5 mg  5 mg Oral PRN       Outpatient Medications:     Medications Prior to Admission   Medication Sig Dispense Refill Last Dose    venlafaxine  MG Oral Capsule SR 24 Hr Take 1 capsule (150 mg total) by mouth once daily. (Patient taking differently: Take 800 mg by mouth once daily.) 90 capsule 3 1/26/2024 at 0600    loratadine 10 MG Oral Tab Take 1 tablet (10 mg total) by mouth daily. Alternate with Zyrtec   Past Week    Vitamin D, Cholecalciferol, 10 MCG (400 UNIT) Oral Cap Take by mouth.   Past Week    Cetirizine HCl 10 MG Oral Chew Tab Chew 1 tablet (10 mg total) by mouth daily. 90 tablet 0 Past Week    diphenhydrAMINE HCl, Sleep, 25 MG Oral Tab Take 2 tablets (50 mg total) by mouth nightly as needed for Sleep.   Past Week    Multiple Vitamins-Minerals (WOMENS MULTIVITAMIN PLUS) Oral Tab Take by mouth.   Past Week       Allergies: Seasonal      Anesthesia Evaluation    Patient summary reviewed.    Anesthetic Complications           GI/Hepatic/Renal    Negative GI/hepatic/renal ROS.                              Cardiovascular    Negative cardiovascular ROS.                                                   Endo/Other    Negative endo/other ROS.                              Pulmonary                    (+) sleep apnea and no treatment      Neuro/Psych      (+) depression                                Past Surgical History:   Procedure Laterality Date    COLONOSCOPY  2022    All clear and no issues.    FATEMEH BIOPSY STEREO NODULE 1 SITE RIGHT (CPT=19081)  10/2017    benign           Social History     Socioeconomic History    Marital status:    Tobacco Use    Smoking status: Never    Smokeless tobacco: Never   Vaping Use    Vaping Use: Never used   Substance and Sexual Activity    Alcohol use: Yes     Alcohol/week: 0.0 standard drinks of alcohol     Comment: Once every 3 to 4 months    Drug use: Yes     Comment: gummies weekend    Sexual activity: Yes   Other Topics Concern    Caffeine Concern No    Exercise No    Seat Belt Yes    Special Diet No    Stress Concern No    Weight Concern No     History   Drug Use     Comment: gummies weekend     Available pre-op labs reviewed.               Airway      Mallampati: II  Mouth opening: 3 FB  TM distance: 4 - 6 cm  Neck ROM: full Cardiovascular    Cardiovascular exam normal.         Dental    Dentition appears grossly intact         Pulmonary    Pulmonary exam normal.                 Other findings              ASA: 2   Plan: MAC  NPO status verified and patient meets guidelines.  Patient has not taken beta blockers in last 24 hours.        Plan/risks discussed with: patient                Present on Admission:  **None**

## 2024-01-26 NOTE — H&P
History of Present Illness:   Ms. Maria Dolroes Soliman is a 53 year old woman who presents with image ejected concern of the right breast.  Patient reports that she has had calcifications in her breast since .  She denies any palpable masses, nipple discharge, skin changes or x-ray symptoms.  She does not have any known family history of breast cancer.  She underwent biopsy back in  of the right breast that was benign.  She had a additional biopsy in the right breast in  that confirmed an intraductal papilloma for which surgical excision was recommended.  She did not see surgery at that time due to other ongoing medical concerns.  She had a recent surveillance bilateral diagnostic evaluation on 2023 that showed surrounding calcifications at the biopsy-proven papilloma site in the breast with otherwise benign findings.  She is here today for evaluation and recommendations for further therapy.             Past Medical History:   Diagnosis Date    Allergic rhinitis      Anxiety      Belching Occasionally    Body piercing at age 15     earrings    Decorative tattoo at age 34     back of neck    Depression      Feeling lonely Around the time of 7th to 8th grade    Headache disorder Off and on    Heartburn About 2 years ago    Hemorrhoids       I will notice some bleeding after a large stool    History of depression Around the time of 7th to 8th grade    Lip mass 2022     Removed by plastic surgeon. Benign     Night sweats About 5 years ago     Happens occasionally    Sleep apnea 2020    Sleep disturbance 2 years ago     Sleeping pills help with this    Stress Same as above    Wears glasses At age 11    Weight gain Off and on past few years     Diet and exercise, then gain it back               Past Surgical History:   Procedure Laterality Date    COLONOSCOPY   2022     All clear and no issues.    FATEMEH BIOPSY STEREO NODULE 1 SITE RIGHT (CPT=19081)   10/2017     benign                  Gynecological History:  Pt is a   Pt was 31 years old at time of first pregnancy.    She has cumulative breastfeeding history of 12 months.  She achieved menarche at age 11 and LMP 10/22/2019  Age of Menopause: 48  Type: natural menopause  She denies any history of hormone replacement therapy   She has history of oral contraceptive use for 30 years, last in 2018.  She denies infertility treatment to achieve pregnancy.     Medications:    No outpatient medications have been marked as taking for the 23 encounter (Appointment) with Kristi Stock MD.         Allergies:    No Active Allergies     Family History:         Family History   Problem Relation Age of Onset    Other (Other) Father           AIDS    Ulcerative Colitis Mother      Colon Polyps Mother      Asthma Mother      Depression Mother      Hypertension Mother      Dementia Maternal Grandfather      Other (Other) Maternal Grandfather           Alzheimers    Dementia Maternal Grandfather      Other (Other) Son           AMANDA Hearing Loss (MOD         She is not of Ashkenazi Baptism ancestry.     Social History:       History   Alcohol Use    0.0 standard drinks of alcohol/week       Comment: Once every 3 to 4 months             History   Smoking Status    Never   Smokeless Tobacco    Never      Ms. Maria Dolores Soliman is  with 1 children. She has 0 siblings. She is currently Employed full-time     Review of Systems:  General:   The patient denies, fever, chills, +night sweats, fatigue, generalized weakness, change in appetite or weight loss.     HEENT:     The patient denies eye irritation, cataracts, redness, glaucoma, yellowing of the eyes, change in vision, color blindness, or +wearing contacts/glasses. The patient denies hearing loss, ringing in the ears, ear drainage, earaches, nasal congestion, nose bleeds, +snoring, pain in mouth/throat, hoarseness, change in voice, facial trauma.     Respiratory:  The patient denies  chronic cough, phlegm, hemoptysis, pleurisy/chest pain, pneumonia, asthma, wheezing, difficulty in breathing with exertion, emphysema, chronic bronchitis, shortness of breath or abnormal sound when breathing.      Cardiovascular:  There is no history of chest pain, chest pressure/discomfort, palpitations, irregular heartbeat, fainting or near-fainting, difficulty breathing when lying flat, SOB/Coughing at night, swelling of the legs or chest pain while walking.     Breasts:  See history of present illness     Gastrointestinal:     There is no history of difficulty or pain with swallowing, +reflux symptoms, vomiting, dark or bloody stools, constipation, yellowing of the skin, indigestion, nausea, change in bowel habits, diarrhea, abdominal pain or vomiting blood.      Genitourinary:  The patient denies frequent urination, needing to get up at night to urinate, urinary hesitancy or retaining urine, painful urination, urinary incontinence, decreased urine stream, blood in the urine or vaginal/penile discharge.     Skin:    The patient denies rash, itching, skin lesions, dry skin, change in skin color or change in moles.      Hematologic/Lymphatic:  The patient denies easily bruising or bleeding or persistent swollen glands or lymph nodes.      Musculoskeletal:  The patient denies muscle aches/pain, joint pain, stiff joints, neck pain, back pain or bone pain.     Neuropsychiatric:  There is no history of migraines or severe headaches, seizure/epilepsy, speech problems, coordination problems, trembling/tremors, fainting/black outs, dizziness, memory problems, loss of sensation/numbness, problems walking, weakness, tingling or burning in hands/feet. There is no history of abusive relationship, bipolar disorder, sleep disturbance, +anxiety, +depression or feeling of despair.     Endocrine:    There is no history of poor/slow wound healing, weight loss/gain, fertility or hormone problems, cold intolerance, thyroid disease.       Allergic/Immunologic:  There is no history of hives, hay fever, angioedema or anaphylaxis.     /86 (BP Location: Left arm, Patient Position: Sitting, Cuff Size: large)   Pulse 79   Temp 98 °F (36.7 °C) (Temporal)   Resp 18   Ht 1.727 m (5' 8\")   Wt 110.5 kg (243 lb 9.6 oz)   LMP 11/15/2019 (LMP Unknown)   SpO2 97%   BMI 37.04 kg/m²      Physical Exam:  The patient is an alert, oriented, well-nourished and  well-developed woman who appears her stated age. Her speech patterns and movements are normal. Her affect is appropriate.     HEENT: The head is normocephalic. The neck is supple. The thyroid is not enlarged and is without palpable masses/nodules. There are no palpable masses. The trachea is in the midline. Conjunctiva are clear, non-icteric.     Chest: The chest expands symmetrically. The lungs are clear to auscultation.     Heart: The rhythm is regular.  There are no murmurs, rubs, gallops or thrills.     Breasts:  Her breasts are symmetrical with a cup size 38C.  Right breast: The skin, nipple ,and areola appear normal. There is no skin dimpling with movement of the pectoralis. There is no nipple retraction. No nipple discharge can be elicited. The parenchyma is mildly nodular. There are no dominant masses in the breast. The axillary tail is normal.  Left breast:   The skin, nipple, and areola appear normal. There is no skin dimpling with movement of the pectoralis. There is no nipple retraction. No nipple discharge can be elicited. The parenchyma is mildly nodular. There are no dominant masses in the breast. The axillary tail is normal.     Abdomen:  The abdomen is soft, flat and non tender. The liver is not enlarged. There are no palpable masses.     Lymph Nodes:  The supraclavicular, axillary and cervical regions are free of significant lymphadenopathy.     Back: There is no vertebral column tenderness.     Skin: The skin appears normal. There are no suspicious appearing rashes or  lesions.     Extremities: The extremities are without deformity, cyanosis or edema.     Impression:   Ms. Maria Dolores Soliman is a 53 year old woman presents with right breast intraductal papilloma.     Discussion and Plan:  I had a discussion with the Patient regarding her breast exam. On exam today I found no clinical evidence of concern bilaterally.  I first reviewed the recent imaging prior pathology and we discussed this at length.     The significance inefficacious to papilloma including possible association with atypia and/or intraductal malignancy was discussed with the patient.  For this reason I recommended right breast wire localized excision.   The risks and possible complications of the procedure were explained to the patient and her family and she understood and agreed to the proposed plan. She was given ample opportunity for questions and those questions were answered to her satisfaction. She has been  encouraged to contact the office with any questions or concerns prior to her next appointment.     Pre-op Diagnosis: Intraductal papilloma of right breast [D24.1]    The above referenced H&P was reviewed by Kristi Stock MD on 1/26/2024, the patient was examined and no significant changes have occurred in the patient's condition since the H&P was performed.  I discussed with the patient and/or legal representative the potential benefits, risks and side effects of this procedure; the likelihood of the patient achieving goals; and potential problems that might occur during recuperation.  I discussed reasonable alternatives to the procedure, including risks, benefits and side effects related to the alternatives and risks related to not receiving this procedure.  We will proceed with procedure as planned.

## 2024-01-26 NOTE — BRIEF OP NOTE
Pre-Operative Diagnosis: Intraductal papilloma of right breast [D24.1]     Post-Operative Diagnosis: Intraductal papilloma of right breast [D24.1]      Procedure Performed:   Right breast wire localized excisional biopsy    Surgeon(s) and Role:     * Kristi Stock MD - Primary    Assistant(s):  Surgical Assistant.: Miriam Pace CSA     Surgical Findings: Clip seen on xray     Specimen: R lumpectomy     Estimated Blood Loss: Blood Output: 5 mL (1/26/2024 12:44 PM)      Kristi Stock MD  1/26/2024  1:00 PM

## 2024-01-26 NOTE — DISCHARGE INSTRUCTIONS
Breast Surgery  Post-operative Instructions  Excisional Biopsy  Kristi Stock MD  General Instructions  The following instructions will provide helpful information that will assist your recovery. These are designed to be general guidelines. Please remember that everyone heals and recovers differently. Listen to your body and rest when you are tired. If you have any questions or concerns, please do not hesitate to contact my office. I would like to see you in the office about one week after surgery, please schedule and appointment through my office to make a post-operative appointment if you do not already have one.     Restrictions  There are no lifting weight restrictions for the arm on the surgical side. You may gradually increase the amount of weight based on your comfort level. You should avoid a lot of repetitious activity with the arm until  the wound is well-healed (about two weeks).   You should not drive a car until you believe you can react to an emergency situation and you’re no longer taking narcotic pain medications.   You may shower the day after surgery. You should not bathe or swim (i.e. submerge wound) until the wound is well healed (about two weeks).  There are no dietary restrictions.    Exercise  You may begin arm exercises within a couple days. Do these 2 or 3 times per day, beginning with light exercise and gradually increase your range of motion and repetitions. This will help your arm regain full mobility. We will address your activity level again at your post-operative visit.   You will have pain medication prescribed before discharge. Take this as directed to relieve pain. It is important that you be comfortable so that you may continue your stretching exercises.   If you find the medication prescribed is too strong, try Tylenol (Acetaminophen) or Ibuprofen.    Wound Care  You may remove the gauze dressing on the first or second postoperative day and then shower. You should leave the  steri-strips in place; they will start to peel off about 10 days after your surgery. The stitches are all underneath the skin and will dissolve on their own. I encourage you to shower once the outer bandage is removed, you may use soap and water directly over the steri-strips and pat dry following.  You should keep gauze dressing on the wound until the wound is completely dry and without drainage-usually 1-3 days.   If a surgical bra was placed after the surgery, I encourage you to wear it as much as possible during the week following the procedure (including during sleep). Alternatively, you may choose to wear your own bra provided it is comfortable, provides support and does not have an underwire. If the breast doesn’t move it is less painful.  It is normal to feel a lump in the area of the incisions for up to 6 months. This is part of the healing process. Eventually the breast will return to its normal condition.   Pain Medication  You will be given a prescription for a narcotic pain medication (usually Norco) upon discharge. Many patients have very little pain and don’t want to use the narcotic. Don’t be afraid to use it if you’re uncomfortable. If you’d prefer you may substitute Tylenol or Ibuprofen (Motrin, Advil). Using an ice pack for a few minutes over the incision can also alleviate pain. If you do use the narcotic medication, use an over the counter stool softener or gentle laxative and stay well-hydrated as constipation is not uncommon with narcotics.    Pathology Report  The Pathology report is usually available 4-5 business days following the surgery. I will call you  with the results once the report is available.    Notify my office if:   Your temperature is over 101.5 F   You notice increasing swelling, redness, warmth or drainage from around the incision or drain site.    If you experience any problems please call my office and either my nurse or myself will respond. After hours, you will be  forwarded to my answering service which will help you get in touch with myself or the physician covering for me.      You have been given a prescription for Norco 5/325  Take this medication as directed  This medication contains Tylenol (acetaminophen)  Do not take additional Tylenol while taking Norco    Norco is a Narcotic and can be constipating or upset your stomach  Don't take Norco on an empty stomach  Drink plenty of water  Alcoholic beverages should be avoided while taking narcotics

## 2024-01-26 NOTE — OPERATIVE REPORT
[facility]    PATIENT'S NAME: ADALID LEAL   ATTENDING PHYSICIAN: Kristi Stock M.D.   OPERATING PHYSICIAN: Kristi Stock M.D.   PATIENT ACCOUNT#:   [id number]    LOCATION:  [facility] 9 EDWP 10  MEDICAL RECORD #:   [id number]       YOB: 1970  ADMISSION DATE:       01/26/2024      OPERATION DATE:  01/26/2024    OPERATIVE REPORT      PREOPERATIVE DIAGNOSIS:  Intraductal papilloma of the right breast.  POSTOPERATIVE DIAGNOSIS:  Intraductal papilloma of the right breast.  PROCEDURE:  Right breast wire-localized lumpectomy with right breast specimen radiography.    ASSISTANT:  Miriam Pace CSA     ANESTHESIA:  Monitored anesthesia care and local.    ESTIMATED BLOOD LOSS:  5 mL.    DRAINS:  None.    COMPLICATIONS:  None.    DISPOSITION:  Stable on transfer to recovery room.    INDICATIONS:  Patient is a 53-year-old female with an imaging-detected concern of the right breast.  She was found to have an area of calcifications with a papilloma biopsy-proven at that site.  We discussed possible association with atypia and/or intraductal malignancy.  I recommended formal surgical excision.  Risks and possible complications were discussed with the patient including, but not limited to, infection, bleeding, injury to surrounding structures, and possible need for reoperation.  She agreed to the proposed surgery.    OPERATIVE TECHNIQUE:  Patient brought to the imaging suite.  She underwent a wire localization of the area of concern in the right breast.  She was then brought to the OR, placed in supine position, properly padded and secured, given a dose of IV antibiotics, and sequential compression devices were applied to the legs for DVT prophylaxis.  Monitored anesthesia care was induced.  The right breast was prepped and draped in usual sterile fashion.  Lidocaine 1% with epinephrine was used to infiltrate the skin and subcutaneous tissues of the targeted incision site.  A  curvilinear incision was made along the medial areolar border with a 15-blade knife in the skin.  The wire was identified, brought in the field, and a segment of breast tissue surrounding the tip of the wire was carefully excised and oriented with a short stitch and single clip superiorly and a long stitch and double clip laterally in order to allow for appropriate pathological margin assessment and review.  It was then placed in the imaging device, where specimen x-ray confirmed the presence of targeted area, residual area with adequate margins as deemed by myself.  A clip was then placed back within the cavity to assist with subsequent surveillance.  The wound was irrigated.  Hemostasis was assured with electrocautery.  Deep tissue reapproximated with a running 3-0 PDS suture.  Wound was then closed with interrupted 3-0 Vicryl for deep layer and a running 4-0 subcuticular Monocryl for skin.  Mastisol and Steri-Strips were applied, and 0.5% Marcaine was instilled in the cavity to assist with postoperative analgesia.  A sterile dressing and compression bra were placed.  Blood loss was minimal.  All counts were correct at the conclusion of the procedure.  She tolerated the procedure well.  She was transferred to the recovery area in stable condition.    Dictated By Kristi Stock M.D.  d: 01/26/2024 13:14:28  t: 01/26/2024 13:55:55  [facility] [id number]  [place]/    cc: Rubi Hernadez D.O.

## 2024-02-02 ENCOUNTER — OFFICE VISIT (OUTPATIENT)
Dept: SURGERY | Facility: CLINIC | Age: 54
End: 2024-02-02
Payer: COMMERCIAL

## 2024-02-02 VITALS
DIASTOLIC BLOOD PRESSURE: 90 MMHG | OXYGEN SATURATION: 97 % | SYSTOLIC BLOOD PRESSURE: 144 MMHG | BODY MASS INDEX: 37.44 KG/M2 | HEART RATE: 80 BPM | RESPIRATION RATE: 18 BRPM | TEMPERATURE: 97 F | HEIGHT: 68 IN | WEIGHT: 247 LBS

## 2024-02-02 DIAGNOSIS — D24.1 INTRADUCTAL PAPILLOMA OF RIGHT BREAST: Primary | ICD-10-CM

## 2024-02-02 PROCEDURE — 99024 POSTOP FOLLOW-UP VISIT: CPT | Performed by: SURGERY

## 2024-02-02 NOTE — PROGRESS NOTES
Breast Surgery Post-Operative Visit    Diagnosis: Intraductal papilloma, right breast, s/p excisional biopsy on 1/26/2024    Stage: N/A    Disease Status:  Surgical treatment complete, no further treatment pending.    History of Present Illness:   Ms. Maria Dolores Soliman is a 53 year old woman who presents with image ejected concern of the right breast.  Patient reports that she has had calcifications in her breast since 2017.  She denies any palpable masses, nipple discharge, skin changes or x-ray symptoms.  She does not have any known family history of breast cancer.  She underwent biopsy back in 2017 of the right breast that was benign.  She had a additional biopsy in the right breast in 2022 that confirmed an intraductal papilloma for which surgical excision was recommended.  She did not see surgery at that time due to other ongoing medical concerns.  She had a recent surveillance bilateral diagnostic evaluation on November 7, 2023 that showed surrounding calcifications at the biopsy-proven papilloma site in the breast with otherwise benign findings. She underwent excisional biopsy, which occurred without complication. She is here for postoperative visit. She reports her pain is under control. She denies erythema, warmth, drainage, or fevers. She is here today for evaluation and recommendations for further therapy.        Past Medical History:   Diagnosis Date    Allergic rhinitis     Anxiety     Belching Occasionally    Body piercing at age 15    earrings    Decorative tattoo at age 34    back of neck    Depression     Feeling lonely Around the time of 7th to 8th grade    Headache disorder Off and on    Heartburn About 2 years ago    Hemorrhoids     I will notice some bleeding after a large stool    History of depression Around the time of 7th to 8th grade    Lip mass 05/31/2022    Removed by plastic surgeon. Benign 2023    Night sweats About 5 years ago    Happens occasionally    Sleep apnea 09/23/2020     Sleep disturbance 2 years ago    Sleeping pills help with this    Stress Same as above    Wears glasses At age 11    Weight gain Off and on past few years    Diet and exercise, then gain it back       Past Surgical History:   Procedure Laterality Date    COLONOSCOPY  2022    All clear and no issues.    FATEMEH BIOPSY STEREO NODULE 1 SITE RIGHT (CPT=19081)  10/2017    benign             Gynecological History:  Pt is a   Pt was 31 years old at time of first pregnancy.    She has cumulative breastfeeding history of 12 months.  She achieved menarche at age 11 and LMP 10/22/2019  Age of Menopause: 48  Type: natural menopause  She denies any history of hormone replacement therapy   She has history of oral contraceptive use for 30 years, last in 2018.  She denies infertility treatment to achieve pregnancy.    Medications:     HYDROcodone-acetaminophen 5-325 MG Oral Tab Take 1-2 tablets by mouth every 6 (six) hours as needed for Pain. 20 tablet 0    venlafaxine  MG Oral Capsule SR 24 Hr Take 1 capsule (150 mg total) by mouth once daily. (Patient taking differently: Take 800 mg by mouth once daily.) 90 capsule 3    loratadine 10 MG Oral Tab Take 1 tablet (10 mg total) by mouth daily. Alternate with Zyrtec      Vitamin D, Cholecalciferol, 10 MCG (400 UNIT) Oral Cap Take by mouth.      Cetirizine HCl 10 MG Oral Chew Tab Chew 1 tablet (10 mg total) by mouth daily. 90 tablet 0    diphenhydrAMINE HCl, Sleep, 25 MG Oral Tab Take 2 tablets (50 mg total) by mouth nightly as needed for Sleep.      Multiple Vitamins-Minerals (WOMENS MULTIVITAMIN PLUS) Oral Tab Take by mouth.         Allergies:    Allergies   Allergen Reactions    Seasonal ITCHING       Family History:   Family History   Problem Relation Age of Onset    Other (Other) Father         AIDS    Ulcerative Colitis Mother     Colon Polyps Mother     Asthma Mother     Depression Mother     Hypertension Mother     Dementia Maternal Grandfather     Other (Other)  Maternal Grandfather         Alzheimers    Dementia Maternal Grandfather     Other (Other) Son         AMANDA Hearing Loss (MOD       She is not of Ashkenazi [place]ondo[place] ancestry.    Social History:  History   Alcohol Use    0.0 standard drinks of alcohol/week     Comment: Once every 3 to 4 months       History   Smoking Status    Never   Smokeless Tobacco    Never     Ms. Maria Dolores Soliman is  with 1 children. She has 0 siblings. She is currently Employed full-time    Review of Systems:  General:   The patient denies, fever, chills, +night sweats, fatigue, generalized weakness, change in appetite or weight loss.    HEENT:     The patient denies eye irritation, cataracts, redness, glaucoma, yellowing of the eyes, change in vision, color blindness, or +wearing contacts/glasses. The patient denies hearing loss, ringing in the ears, ear drainage, earaches, nasal congestion, nose bleeds, +snoring, pain in mouth/throat, hoarseness, change in voice, facial trauma.    Respiratory:  The patient denies chronic cough, phlegm, hemoptysis, pleurisy/chest pain, pneumonia, asthma, wheezing, difficulty in breathing with exertion, emphysema, chronic bronchitis, shortness of breath or abnormal sound when breathing.     Cardiovascular:  There is no history of chest pain, chest pressure/discomfort, palpitations, irregular heartbeat, fainting or near-fainting, difficulty breathing when lying flat, SOB/Coughing at night, swelling of the legs or chest pain while walking.    Breasts:  See history of present illness    Gastrointestinal:     There is no history of difficulty or pain with swallowing, +reflux symptoms, vomiting, dark or bloody stools, constipation, yellowing of the skin, indigestion, nausea, change in bowel habits, diarrhea, abdominal pain or vomiting blood.     Genitourinary:  The patient denies frequent urination, needing to get up at night to urinate, urinary hesitancy or retaining urine, painful urination, urinary  incontinence, decreased urine stream, blood in the urine or vaginal/penile discharge.    Skin:    The patient denies rash, itching, skin lesions, dry skin, change in skin color or change in moles.     Hematologic/Lymphatic:  The patient denies easily bruising or bleeding or persistent swollen glands or lymph nodes.     Musculoskeletal:  The patient denies muscle aches/pain, joint pain, stiff joints, neck pain, back pain or bone pain.    Neuropsychiatric:  There is no history of migraines or severe headaches, seizure/epilepsy, speech problems, coordination problems, trembling/tremors, fainting/black outs, dizziness, memory problems, loss of sensation/numbness, problems walking, weakness, tingling or burning in hands/feet. There is no history of abusive relationship, bipolar disorder, sleep disturbance, +anxiety, +depression or feeling of despair.    Endocrine:    There is no history of poor/slow wound healing, weight loss/gain, fertility or hormone problems, cold intolerance, thyroid disease.     Allergic/Immunologic:  There is no history of hives, hay fever, angioedema or anaphylaxis.    /90 (BP Location: Left arm, Patient Position: Sitting, Cuff Size: adult)   Pulse 80   Temp 97.3 °F (36.3 °C) (Temporal)   Resp 18   Ht 1.727 m (5' 8\")   Wt 112 kg (247 lb)   LMP 11/15/2019 (LMP Unknown)   SpO2 97%   BMI 37.56 kg/m²     Physical Exam:  The patient is an alert, oriented, well-nourished and  well-developed woman who appears her stated age. Her speech patterns and movements are normal. Her affect is appropriate.    HEENT: The head is normocephalic. The neck is supple. The thyroid is not enlarged and is without palpable masses/nodules. There are no palpable masses. The trachea is in the midline. Conjunctiva are clear, non-icteric.    Chest: The chest expands symmetrically. The lungs are clear to auscultation.    Heart: The rhythm is regular.  There are no murmurs, rubs, gallops or thrills.    Breasts:  Her  breasts are symmetrical with a cup size 38C.  Right breast: The skin, nipple ,and areola appear normal. There is no skin dimpling with movement of the pectoralis. There is no nipple retraction. No nipple discharge can be elicited. The parenchyma is mildly nodular. There are no dominant masses in the breast. The axillary tail is normal.  There is a well-healed incision on the breast with no signs of infection.  Left breast:   The skin, nipple, and areola appear normal. There is no skin dimpling with movement of the pectoralis. There is no nipple retraction. No nipple discharge can be elicited. The parenchyma is mildly nodular. There are no dominant masses in the breast. The axillary tail is normal.    Abdomen:  The abdomen is soft, flat and non tender. The liver is not enlarged. There are no palpable masses.    Lymph Nodes:  The supraclavicular, axillary and cervical regions are free of significant lymphadenopathy.    Back: There is no vertebral column tenderness.    Skin: The skin appears normal. There are no suspicious appearing rashes or lesions.    Extremities: The extremities are without deformity, cyanosis or edema.    Impression:   Ms. Maria Dolores Soliman is a 53 year old woman presents with right breast intraductal papilloma, s/p excisional biopsy on 1/26/2024.    Recommendations:   I had a discussion with the Patient regarding her breast exam.  She is healing well since surgery with no signs of infection. I personally reviewed her pathology.  This showed residual benign papilloma with no atypia.  No further treatment is needed.  We discussed this is not increased future breast cancer risk.  Will plan for bilateral diagnostic evaluation at the time of her annual imaging in November 2024 with a clinical exam at that time.  If things are well-healed and stable at that time she will resume annual screening the following year.  She was given ample opportunity for questions and those questions were answered to  her satisfaction. She was encouraged to contact the office with any questions or concerns prior to her next scheduled appointment.

## 2024-03-03 ENCOUNTER — OFFICE VISIT (OUTPATIENT)
Dept: FAMILY MEDICINE CLINIC | Facility: CLINIC | Age: 54
End: 2024-03-03
Payer: COMMERCIAL

## 2024-03-03 VITALS
HEIGHT: 68 IN | WEIGHT: 230 LBS | OXYGEN SATURATION: 98 % | SYSTOLIC BLOOD PRESSURE: 134 MMHG | BODY MASS INDEX: 34.86 KG/M2 | RESPIRATION RATE: 18 BRPM | DIASTOLIC BLOOD PRESSURE: 80 MMHG | TEMPERATURE: 98 F | HEART RATE: 103 BPM

## 2024-03-03 DIAGNOSIS — J06.9 VIRAL UPPER RESPIRATORY TRACT INFECTION WITH COUGH: Primary | ICD-10-CM

## 2024-03-03 PROCEDURE — 87637 SARSCOV2&INF A&B&RSV AMP PRB: CPT | Performed by: NURSE PRACTITIONER

## 2024-03-03 PROCEDURE — 99213 OFFICE O/P EST LOW 20 MIN: CPT | Performed by: NURSE PRACTITIONER

## 2024-03-03 NOTE — PROGRESS NOTES
CHIEF COMPLAINT:     Chief Complaint   Patient presents with    Sinus Problem     Congestion , cough ,and right ear pain radiating to the neck. Symptoms started on Friday   OTC: Advil        HPI:   Maria Dolores Soliman is a 53 year old female who presents for ill symptoms for  3 days. Patient reports that her whole family (, son and grandmother) has been sick with a respiratory virus. Grandma tested negative for COVID. Symptoms have been unchanged since onset.  Treating symptoms with Advil, Nyquil and allergy medications.  Patient denies exposure to COVID. Denies at home COVID testing. Denies nasal congestion, sob, n/v/d, or fever.       Current Outpatient Medications   Medication Sig Dispense Refill    HYDROcodone-acetaminophen 5-325 MG Oral Tab Take 1-2 tablets by mouth every 6 (six) hours as needed for Pain. 20 tablet 0    venlafaxine  MG Oral Capsule SR 24 Hr Take 1 capsule (150 mg total) by mouth once daily. (Patient taking differently: Take 800 mg by mouth once daily.) 90 capsule 3    loratadine 10 MG Oral Tab Take 1 tablet (10 mg total) by mouth daily. Alternate with Zyrtec      Vitamin D, Cholecalciferol, 10 MCG (400 UNIT) Oral Cap Take by mouth.      Cetirizine HCl 10 MG Oral Chew Tab Chew 1 tablet (10 mg total) by mouth daily. 90 tablet 0    diphenhydrAMINE HCl, Sleep, 25 MG Oral Tab Take 2 tablets (50 mg total) by mouth nightly as needed for Sleep.      Multiple Vitamins-Minerals (WOMENS MULTIVITAMIN PLUS) Oral Tab Take by mouth.        Past Medical History:   Diagnosis Date    Allergic rhinitis     Anxiety     Belching Occasionally    Body piercing at age 15    earrings    Decorative tattoo at age 34    back of neck    Depression     Feeling lonely Around the time of 7th to 8th grade    Headache disorder Off and on    Heartburn About 2 years ago    Hemorrhoids     I will notice some bleeding after a large stool    History of depression Around the time of 7th to 8th grade    Lip mass  2022    Removed by plastic surgeon. Benign     Night sweats About 5 years ago    Happens occasionally    Sleep apnea 2020    Sleep disturbance 2 years ago    Sleeping pills help with this    Stress Same as above    Wears glasses At age 11    Weight gain Off and on past few years    Diet and exercise, then gain it back      Past Surgical History:   Procedure Laterality Date    COLONOSCOPY  2022    All clear and no issues.    FATEMEH BIOPSY STEREO NODULE 1 SITE RIGHT (CPT=19081)  10/2017    benign               Social History     Socioeconomic History    Marital status:    Tobacco Use    Smoking status: Never    Smokeless tobacco: Never   Vaping Use    Vaping Use: Never used   Substance and Sexual Activity    Alcohol use: Yes     Alcohol/week: 0.0 standard drinks of alcohol     Comment: Once every 3 to 4 months    Drug use: Yes     Comment: gummies weekend    Sexual activity: Yes   Other Topics Concern    Caffeine Concern No    Exercise No    Seat Belt Yes    Special Diet No    Stress Concern No    Weight Concern No         REVIEW OF SYSTEMS:   GENERAL: unchanged appetite  SKIN: no rashes or abnormal skin lesions  HEENT: See HPI  LUNGS: denies shortness of breath or wheezing, See HPI  CARDIOVASCULAR: denies chest pain or palpitations   GI: denies N/V/C or abdominal pain  NEURO: Denies dizziness or weakness    EXAM:   LMP 11/15/2019 (LMP Unknown)   GENERAL: well developed, well nourished,in no apparent distress  SKIN: no rashes,no suspicious lesions  HEAD: atraumatic, normocephalic.  Denies tenderness on palpation of maxillary/frontal sinuses  EYES: conjunctiva clear, EOM intact  EARS: TM's pearly gray, no bulging on left, right mak, no retraction, no fluid, bony landmarks present  NOSE: Nostrils patent, clear nasal discharge, nasal mucosa pink.   THROAT: Oral mucosa pink, moist. Posterior pharynx is pink. +Cobblestoning. No exudates. Tonsils 1/4.    NECK: Supple, non-tender  LUNGS: clear  to auscultation bilaterally, no wheezes or rhonchi. Breathing is non labored.  CARDIO: RRR without murmur  EXTREMITIES: no cyanosis, clubbing or edema  LYMPH:  No head or neck lymphadenopathy.        ASSESSMENT AND PLAN:   Maria Dolores Soliman is a 53 year old female who presents with upper respiratory symptoms that are consistent with    ASSESSMENT:   Encounter Diagnosis   Name Primary?    Viral upper respiratory tract infection with cough Yes       PLAN:     Comfort care per pt instructions.   To follow up for any new or worsening symptoms.   To go to the ER for any severe symptoms such as difficulty breathing or chest pain.     Meds & Refills for this Visit:  Requested Prescriptions      No prescriptions requested or ordered in this encounter       Risks, benefits, and side effects of medication explained and discussed.    Patient Instructions   May take Tylenol/Ibuprofen for pain.  Supportive care.  Increase oral intake of warm fluids, such as hot tea with honey and lemon.  May gargle with warm salt water.  May use Nedi-pot with distilled water only.  OTC Medications for symptoms.  If symptoms do not get better around day 10 or are worse please follow up with your PCP or RTC.    The patient indicates understanding of these issues and agrees to the plan.  The patient is asked to return if sx's persist or worsen.

## 2024-03-03 NOTE — PATIENT INSTRUCTIONS
May take Tylenol/Ibuprofen for pain.  Supportive care.  Increase oral intake of warm fluids, such as hot tea with honey and lemon.  May gargle with warm salt water.  May use Nedi-pot with distilled water only.  OTC Medications for symptoms.  If symptoms do not get better around day 10 or are worse please follow up with your PCP or RTC.

## 2024-03-04 LAB
FLUAV + FLUBV RNA SPEC NAA+PROBE: NOT DETECTED
FLUAV + FLUBV RNA SPEC NAA+PROBE: NOT DETECTED
RSV RNA SPEC NAA+PROBE: NOT DETECTED
SARS-COV-2 RNA RESP QL NAA+PROBE: NOT DETECTED

## 2024-07-23 ENCOUNTER — OFFICE VISIT (OUTPATIENT)
Dept: FAMILY MEDICINE CLINIC | Facility: CLINIC | Age: 54
End: 2024-07-23
Payer: COMMERCIAL

## 2024-07-23 VITALS
BODY MASS INDEX: 38.5 KG/M2 | TEMPERATURE: 99 F | SYSTOLIC BLOOD PRESSURE: 130 MMHG | DIASTOLIC BLOOD PRESSURE: 76 MMHG | HEART RATE: 82 BPM | OXYGEN SATURATION: 97 % | RESPIRATION RATE: 22 BRPM | WEIGHT: 248.19 LBS | HEIGHT: 67.32 IN

## 2024-07-23 DIAGNOSIS — D24.1 INTRADUCTAL PAPILLOMA OF RIGHT BREAST: ICD-10-CM

## 2024-07-23 DIAGNOSIS — E78.00 PURE HYPERCHOLESTEROLEMIA: ICD-10-CM

## 2024-07-23 DIAGNOSIS — Z13.0 SCREENING FOR ENDOCRINE, NUTRITIONAL, METABOLIC AND IMMUNITY DISORDER: ICD-10-CM

## 2024-07-23 DIAGNOSIS — Z00.00 ANNUAL PHYSICAL EXAM: Primary | ICD-10-CM

## 2024-07-23 DIAGNOSIS — Z12.31 ENCOUNTER FOR SCREENING MAMMOGRAM FOR MALIGNANT NEOPLASM OF BREAST: ICD-10-CM

## 2024-07-23 DIAGNOSIS — F33.1 MODERATE EPISODE OF RECURRENT MAJOR DEPRESSIVE DISORDER (HCC): ICD-10-CM

## 2024-07-23 DIAGNOSIS — Z23 NEED FOR VACCINATION: ICD-10-CM

## 2024-07-23 DIAGNOSIS — Z12.4 SCREENING FOR CERVICAL CANCER: ICD-10-CM

## 2024-07-23 DIAGNOSIS — R74.01 ELEVATED ALT MEASUREMENT: ICD-10-CM

## 2024-07-23 DIAGNOSIS — Z13.29 SCREENING FOR ENDOCRINE, NUTRITIONAL, METABOLIC AND IMMUNITY DISORDER: ICD-10-CM

## 2024-07-23 DIAGNOSIS — Z12.83 SCREENING EXAM FOR SKIN CANCER: ICD-10-CM

## 2024-07-23 DIAGNOSIS — Z13.228 SCREENING FOR ENDOCRINE, NUTRITIONAL, METABOLIC AND IMMUNITY DISORDER: ICD-10-CM

## 2024-07-23 DIAGNOSIS — Z13.21 SCREENING FOR ENDOCRINE, NUTRITIONAL, METABOLIC AND IMMUNITY DISORDER: ICD-10-CM

## 2024-07-23 PROCEDURE — 90471 IMMUNIZATION ADMIN: CPT | Performed by: FAMILY MEDICINE

## 2024-07-23 PROCEDURE — 90472 IMMUNIZATION ADMIN EACH ADD: CPT | Performed by: FAMILY MEDICINE

## 2024-07-23 PROCEDURE — 88175 CYTOPATH C/V AUTO FLUID REDO: CPT | Performed by: FAMILY MEDICINE

## 2024-07-23 PROCEDURE — 90715 TDAP VACCINE 7 YRS/> IM: CPT | Performed by: FAMILY MEDICINE

## 2024-07-23 PROCEDURE — 99396 PREV VISIT EST AGE 40-64: CPT | Performed by: FAMILY MEDICINE

## 2024-07-23 PROCEDURE — 87624 HPV HI-RISK TYP POOLED RSLT: CPT | Performed by: FAMILY MEDICINE

## 2024-07-23 PROCEDURE — 90636 HEP A/HEP B VACC ADULT IM: CPT | Performed by: FAMILY MEDICINE

## 2024-07-23 RX ORDER — VENLAFAXINE HYDROCHLORIDE 150 MG/1
150 CAPSULE, EXTENDED RELEASE ORAL
Qty: 90 CAPSULE | Refills: 3 | Status: SHIPPED | OUTPATIENT
Start: 2024-07-23

## 2024-07-23 NOTE — PROGRESS NOTES
REASON FOR VISIT:    Maria Dolores Soliman is a 53 year old female who presents for an Annual Health Assessment.      No complaints. Doing well with depression. Denies medications side effects. Taking venlafaxine 150mg daily. Denies depressed or anhedonia, crying spells, SI/HI. Has energy, sleeping well.      -not sexually active greater than 1 year  focussing on her career.  Has 20-year-old son, started new job at [employer]  Living with son in house and  patient's mother.    menses: menopausal 10/2019-denies vaginal bleeding  Last pap: 2022- normal , negative HPV  History of abnormal pap: no hx of abnormal   On vit D daily + MVI - taking MVI and vit D3 2000IU   Calcium- monitoring.  Colonoscopy-2022 repeat in 10 years.  Mammogram-2023 birads 2-  hx of right breast intraductal papilloma- s/p lumpectomy  2024- Birads 2- negative diagnostic                    2017 abnormal mammogram.   10/13/2017- right bx stereotactic biopsy benign 2018- did not perform  Exercise -no routine.  Diet- 3 veg servings or more, eating meat and rare fish- eating more fast food at lunch and gained weight  Dentist regularly- yes  Annual eye exam-yes wears glasses  Etoh:  0-1 drinks per month  Cigs: never , no vaping, no illicits  Immunizations:needs Tdap , UTD COVID-19 vaccination  FH significant: Father  of AIDS.  Mother still alive and well.  Denies family history of breast cancer, colon cancer, kidney problems, MI or stroke.  Problem (# of Occurrences) Relation (Name,Age of Onset)   Dementia (1) Maternal Grandfather   Other [OTHER] (2) Maternal Grandfather: Alzheimers, Son: AMANDA Hearing Loss (MOD       Wt Readings from Last 6 Encounters:   24 248 lb 3.2 oz (112.6 kg)   24 230 lb (104.3 kg)   24 247 lb (112 kg)   24 240 lb 4.8 oz (109 kg)   23 243 lb 9.6 oz (110.5 kg)   10/16/23 239 lb 9.6 oz (108.7 kg)         Patient Active Problem List    Diagnosis    Moderate episode of recurrent major depressive disorder (HCC)    TMJ (temporomandibular joint syndrome)    Plantar fasciitis of right foot    BMI 36.0-36.9,adult    Elevated ALT measurement    Chronic left hip pain    Pure hypercholesterolemia    Intraductal papilloma of right breast     Current Outpatient Medications   Medication Sig Dispense Refill    venlafaxine  MG Oral Capsule SR 24 Hr Take 1 capsule (150 mg total) by mouth once daily. (Patient taking differently: Take 800 mg by mouth once daily.) 90 capsule 3    loratadine 10 MG Oral Tab Take 1 tablet (10 mg total) by mouth daily. Alternate with Zyrtec      Vitamin D, Cholecalciferol, 10 MCG (400 UNIT) Oral Cap Take by mouth.      Cetirizine HCl 10 MG Oral Chew Tab Chew 1 tablet (10 mg total) by mouth daily. 90 tablet 0    diphenhydrAMINE HCl, Sleep, 25 MG Oral Tab Take 2 tablets (50 mg total) by mouth nightly as needed for Sleep.      Multiple Vitamins-Minerals (WOMENS MULTIVITAMIN PLUS) Oral Tab Take by mouth.       Wt Readings from Last 6 Encounters:   07/23/24 248 lb 3.2 oz (112.6 kg)   03/03/24 230 lb (104.3 kg)   02/02/24 247 lb (112 kg)   01/26/24 240 lb 4.8 oz (109 kg)   12/05/23 243 lb 9.6 oz (110.5 kg)   10/16/23 239 lb 9.6 oz (108.7 kg)     Body mass index is 38.5 kg/m².    No results found for: \"GLUCOSE\"  Lab Results   Component Value Date    CHOLEST 187 10/27/2023    CHOLEST 210 (H) 06/17/2022    CHOLEST 183 08/07/2021     Lab Results   Component Value Date    HDL 71 10/27/2023    HDL 80 06/17/2022    HDL 75 08/07/2021     No results found for: \"TRIGLY\"  Lab Results   Component Value Date    LDL 99 10/27/2023     (H) 06/17/2022    LDL 92 08/07/2021     Lab Results   Component Value Date    AST 25 10/27/2023    AST 35 06/17/2022    AST 23 08/07/2021     Lab Results   Component Value Date    ALT 31 (H) 10/27/2023    ALT 43 (H) 06/17/2022    ALT 22 08/07/2021     Lab Results   Component Value Date    TSH 1.230 05/31/2016      Lab Results   Component Value Date    BUN 12 10/27/2023    BUN 14 06/17/2022    BUN 15 08/07/2021    CREATSERUM 0.73 10/27/2023    CREATSERUM 0.88 06/17/2022    CREATSERUM 0.78 08/07/2021       General Health     How would you describe your current health state?: Poor    Type of Diet: Other    How do you maintain positive mental well-being?: Social Interaction;Visiting Family;Visiting Friends    How would you describe your daily physical activity?: Moderate    If you are a male age 45-79 or a female age 55-79, do you take aspirin?: No    Have you had any immunizations at another office such as Influenza, Hepatitis B, Tetanus, or Pneumococcal?: No    At any time do you feel concerned for the safety/well-being of yourself and/or your children, in your home or elsewhere?: No     CAGE:     Cut: Have you ever felt you should Cut down on your drinking?: No    Annoyed: Have people Annoyed you by criticizing your drinking?: No    Guilty: Have you ever felt bad or Guilty about your drinking?: No    Eye Opener: Have you ever had a drink first thing in the morning to steady your nerves or to get rid of a hangover (Eye opener)?: No    Scoring  Total Score: 0     Depression Screening (PHQ-2/PHQ-9): Over the LAST 2 WEEKS   Little interest or pleasure in doing things (over the last two weeks)?: Not at all    Feeling down, depressed, or hopeless (over the last two weeks)?: Not at all    PHQ-2 SCORE: 0        PREVENTATIVE SERVICES  INDICATIONS AND SCHEDULE Recommendation Internal Lab or Procedure External Lab or Procedure   Breast Cancer Screening   Every 2 yrs age 50-74 Health Maintenance   Topic Date Due    Mammogram  11/07/2024       Pap Every 3 yrs age 21-65 or Pap and HPV every 5 yrs age 30-65 Health Maintenance   Topic Date Due    Pap Smear  05/31/2025       Chlamydia Screening Screen Annually age<25, if sex active/on OCPs; >24 high risk No results found for: \"CHLAMYDIA\"    Colonoscopy Screen Every 10 years Health  Maintenance   Topic Date Due    Colorectal Cancer Screening  02/05/2032       Flex Sigmoidoscopy Screen  Every 5 years No results found for this or any previous visit.    Fecal Occult Blood  Annually No results found for: \"FOB\", \"OCCULTSTOOL\"    Obesity Screening Screen all adults annually Body mass index is 38.5 kg/m².      Preventive Services for Which Recommendations Vary with Risk Recommendation Internal Lab or Procedure External Lab or Procedure   Cholesterol Screening Recommended screening varies with age, risk and gender LDL-CHOLESTEROL (mg/dL (calc))   Date Value   10/27/2023 99       Diabetes Screening  if history of high blood pressure or other  risk factors HEMOGLOBIN A1c (% of total Hgb)   Date Value   10/27/2023 5.4     GLUCOSE (mg/dL)   Date Value   10/27/2023 99         Gonorrhea Screening if high risk No results found for: \"GONOCOCCUS\"    HIV Screening For all adults age 18-65, older adults at increased risk No results found for: \"HIV\"    Syphilis Screening Screen if pregnant or high risk No results found for: \"RPR\"    Hepatitis C Screening Screen those at high risk plus screen one time for adults born 1946-1 965 No results found for: \"HCVAB\"    Tuberculosis Screen if high risk No components found for: \"PPDINDURAT\"      Disease Monitoring: none    ALLERGIES:     Allergies   Allergen Reactions    Seasonal ITCHING       CURRENT MEDICATIONS:   Current Outpatient Medications   Medication Sig Dispense Refill    venlafaxine  MG Oral Capsule SR 24 Hr Take 1 capsule (150 mg total) by mouth once daily. (Patient taking differently: Take 800 mg by mouth once daily.) 90 capsule 3    loratadine 10 MG Oral Tab Take 1 tablet (10 mg total) by mouth daily. Alternate with Zyrtec      Vitamin D, Cholecalciferol, 10 MCG (400 UNIT) Oral Cap Take by mouth.      Cetirizine HCl 10 MG Oral Chew Tab Chew 1 tablet (10 mg total) by mouth daily. 90 tablet 0    diphenhydrAMINE HCl, Sleep, 25 MG Oral Tab Take 2 tablets (50  mg total) by mouth nightly as needed for Sleep.      Multiple Vitamins-Minerals (WOMENS MULTIVITAMIN PLUS) Oral Tab Take by mouth.        MEDICAL INFORMATION:   Past Medical History:    Allergic rhinitis    Anxiety    Belching    Body piercing    earrings    Decorative tattoo    back of neck    Depression    Feeling lonely    Headache disorder    Heartburn    Hemorrhoids    I will notice some bleeding after a large stool    History of depression    Lip mass    Removed by plastic surgeon. Benign     Night sweats    Happens occasionally    Sleep apnea    Sleep disturbance    Sleeping pills help with this    Stress    Wears glasses    Weight gain    Diet and exercise, then gain it back      Past Surgical History:   Procedure Laterality Date    Colonoscopy  2022    All clear and no issues.    Mac biopsy stereo nodule 1 site right (cpt=19081)  10/2017    benign            Family History   Problem Relation Age of Onset    Other (Other) Father         AIDS    Ulcerative Colitis Mother     Colon Polyps Mother     Asthma Mother     Depression Mother     Hypertension Mother     Dementia Maternal Grandfather     Other (Other) Maternal Grandfather         Alzheimers    Dementia Maternal Grandfather     Other (Other) Son         AMANDA Hearing Loss (MOD      SOCIAL HISTORY:   Social History     Socioeconomic History    Marital status:    Tobacco Use    Smoking status: Never    Smokeless tobacco: Never   Vaping Use    Vaping status: Never Used   Substance and Sexual Activity    Alcohol use: Yes     Comment: Once every 3 to 4 months    Drug use: Yes     Comment: gummies weekend    Sexual activity: Yes   Other Topics Concern    Caffeine Concern No    Exercise No    Seat Belt Yes    Special Diet No    Stress Concern No    Weight Concern Yes     Occ:   : yes       REVIEW OF SYSTEMS:   GENERAL: feels well otherwise  SKIN: denies any unusual skin lesions  EYES: denies blurred vision or double vision  HEENT:  denies nasal congestion, sinus pain or ST  LUNGS: denies shortness of breath with exertion  CARDIOVASCULAR: denies chest pain on exertion  GI: denies abdominal pain, denies heartburn  : Occasional hot flashes, denies dysuria, vaginal discharge or itching, periods-amenorrhea since 10/2019  MUSCULOSKELETAL: right knee pain denies back pain  NEURO: denies headaches  PSYCHE: hx depression or anxiety  HEMATOLOGIC: denies hx of anemia  ENDOCRINE: denies thyroid history  ALL/ASTHMA: denies hx of allergy or asthma    EXAM:   /76 (BP Location: Left arm, Patient Position: Sitting, Cuff Size: adult)   Pulse 82   Temp 98.6 °F (37 °C) (Temporal)   Resp 22   Ht 5' 7.32\" (1.71 m)   Wt 248 lb 3.2 oz (112.6 kg)   LMP 11/15/2019 (LMP Unknown)   SpO2 97%   BMI 38.50 kg/m²    Patient's last menstrual period was 11/15/2019 (lmp unknown).   GENERAL: well developed, well nourished, in no apparent distress  SKIN: no rashes, no suspicious lesions,  HEENT: atraumatic, normocephalic, ears and throat are clear,   EYES:PERRLA, EOMI, normal optic disk, conjunctiva are clear  NECK: supple, no adenopathy, no bruits  CHEST: no chest tenderness  BREAST: Normal breasts bilateral.  No masses.  Axilla clear bilateral.    LUNGS: clear to auscultation  CARDIO: RRR without murmur  GI: good BS's, no masses, HSM or tenderness  : Normal sternal vagina, labia, introitus.  Normal vagina, normal cervix ThinPrep performed. Normal adnexa bilaterally. Normal uterus and ovaries No CMT.[  RECTAL: Patent  MUSCULOSKELETAL: back is not tender, FROM of the back  EXTREMITIES: no cyanosis, clubbing or edema  NEURO: Oriented times three, cranial nerves are intact, motor and sensory are grossly intact  PSYCHE: normal affect. Normal speech. Good eye contact. Well-groomed.    ASSESSMENT AND OTHER RELEVANT CHRONIC CONDITIONS:   Maria Dolores Soliman is a 53 year old female who presents for an Annual Health Assessment.     PLAN SUMMARY:     1.  Annual physical  exam  -Encourage healthy Mediterranean type diet   -Encouraged exercise 30 minutes to 60 minutes 5 times  weekly to prevent obesity and chronic disease and eliminate stress and its effect on the body.  -encouraged not to continue not smoking  -Osteoporosis prevention-recommend ingesting thousand milligrams daily  -Vit D 32 2000iu daily recommended  -safe sex practices discussed - recommend condom all couples prevent STD  -immunizations-needs Tdap. UTD flu shot in fall recommended, completed COVID-19 #2 booster  1. Annual physical exam  - CBC With Differential With Platelet  - Comp Metabolic Panel (14)  - Lipid Panel  - TSH W Reflex To Free T4  - TETANUS, DIPHTHERIA TOXOIDS AND ACELLULAR PERTUSIS VACCINE (TDAP), >7 YEARS, IM USE  - HGB A1C [496] [Q]  - Hep A & Hep B (Twinrix) 18yrs & older [98040]  - ThinPrep PAP Smear; Future  - Hpv Dna  High Risk , Thin Prep Collect; Future  - ThinPrep PAP Smear  - Hpv Dna  High Risk , Thin Prep Collect  - Image-Guided Pap Smear ([facility])    2. Pure hypercholesterolemia  - Lipid Panel  The patient's ASCVD 10 year risk score is 1.3.  encourage mediterranean diet  Exercise brisk walk 30-60 mins at least 5 days weekly  Lose weight if overweight  Recheck fasting labs annually to monitor lifestyle control     3. Elevated ALT measurement  - Comp Metabolic Panel (14)    4. BMI 38.0-38.9,adult  - HGB A1C [496] [Q]  Encouraged weight loss  Consider weight watchers  Goal BMI 25  Wants to work on weight loss on own-making life changes with new job and more regular schedule.  -weight loss recommended due to increased long term health risks of diabetes, CAD, stroke, HTN and cancer  -follow Mediterranean diet  -options discussed- weight watchers, referral weight loss clinic-consider if not successful in the next year  -Minimum exercise 30 minutes 5 days a week aerobic activity goals 150 to 300 minutes weekly.    5. Moderate episode of recurrent major depressive disorder  (HCC)  Controlled  -contracts for safety- if suicidal or homicidal, call 911 or go to nearest ER and call office  Exercise 3 x weekly x 30-60min  Work on sleep hygiene and getting rid of Benadryl at night/diphenhydramine    6. Intraductal papilloma of right breast  S/p lumpectomy  Mammograms ordered by Dr Stock and will have annual follow up with surgical oncology    7. Screening exam for skin cancer  Has annual skin cancer screen with dermatologist    8. Need for vaccination  - TETANUS, DIPHTHERIA TOXOIDS AND ACELLULAR PERTUSIS VACCINE (TDAP), >7 YEARS, IM USE  - Hep A & Hep B (Twinrix) 18yrs & older [22385]    9. Screening for endocrine, nutritional, metabolic and immunity disorder  - CBC With Differential With Platelet  - Comp Metabolic Panel (14)  - TSH W Reflex To Free T4    10. Encounter for screening mammogram for malignant neoplasm of breast  - HGB A1C [496] [Q]    11. Screening for cervical cancer  - ThinPrep PAP Smear; Future  - Hpv Dna  High Risk , Thin Prep Collect; Future  - ThinPrep PAP Smear  - Hpv Dna  High Risk , Thin Prep Collect  - Image-Guided Pap Smear ([facility])      Return in about 1 year (around 7/23/2025) for annual physical, sooner if needed., fasting labs AM.    Diet counseling perfomed  Exercise counseling perfomed  STI Prevention counseling perfomed  Endometrial cancer awareness counseling performed    SUGGESTED VACCINATIONS - Influenza, Pneumococcal, Zoster, Tetanus     Immunization History   Administered Date(s) Administered    >= 3 Yrs, Influenza Vaccine,(53391),[facility] 09/10/2016    Covid-19 Vaccine Moderna 100 mcg/0.5 ml 02/19/2021, 03/26/2021, 07/22/2022    Covid-19 Vaccine Moderna 50 Mcg/0.25 Ml 12/06/2021    FLULAVAL 6 months & older 0.5 ml Prefilled syringe (91691) 11/09/2018, 09/09/2019, 10/17/2020, 10/25/2022    FLUZONE 6 months and older PFS 0.5 ml (37437) 09/18/2023    Influenza 08/26/2017    MMR 09/09/2019    Pfizer Covid-19 Vaccine 30mcg/0.3ml 12yrs+ (6047-3220)  09/18/2023    Positive Measles Titer 12/23/2017    Positive Mumps Titer 12/23/2017    Positive Rubella Titer 12/23/2017    TDAP 10/01/2013    Tb Intradermal Test 10/03/2016    Varicella Deferred (Had Chicken Pox) 05/01/1976    Zoster Vaccine Recombinant Adjuvanted (Shingrix) 05/31/2022, 10/25/2022       Influenza Annually   Pneumococcal if high risk   Td/Tdap once then every 10 years   HPV Females 11-26: 3 doses   Zoster (Shingles) 60 and older: one dose   Varicella 2 doses if not immune   MMR 1-2 doses if born after 1957 and not immune

## 2024-07-23 NOTE — PATIENT INSTRUCTIONS
Perform labs fasting 8 hours with water or black coffee or or black tea diet  soda only prior to exam.    -Encourage healthy diet of whole food and avoid processed food and sugary drinks and sodas.  Diet should include lean meats and vegetables including 5-7 servings of fruit and vegetables total in 1 day.  Never skip breakfast.  -Encouraged exercise 30 minutes to 60 minutes 3-5 times weekly for 150minutes or more to prevent obesity and chronic disease and eliminate stress and its effect on the body.  -encouraged to continue not smoking or vaping  - recommend condom use per CDC recommendation for all  or unmarried couples  -mammogram order given if 40years old or older  - immunizations-annual flu shot recommended  -Vitamin D3  2000 units daily recommended- buy Over-the-counter  -Recommend 1000mg of calcium daily for osteoporosis prevention discussed. Need to ingest 1000mg of calcium daily to prevent osteoporosis later in life.  I.e. one 8 ounce glass of silk [place] milk has 450 mg of calcium and label states 45%.  Labels list calcium percentages not milligrams.  To calculate milligrams per serving remove the percentage and add a zero (0).  I.e. 9% calcium equals 90 mg  -thin prep pap recommended every 3 years-If previous pap was normal  sooner as directed by your doctor.  If vaginal bleeding occurs after menopause or 1 year after your period stopped, please contact your primary care physician or OB/GYN and schedule an appointment as soon as possible to rule out endometrial cancer    Exercise for a Healthier Heart     Exercise with a friend. When activity is fun, you're more likely to stick with it.   You may wonder how you can improve the health of your heart. If you’re thinking about exercise, you’re on the right track. You don’t need to become an athlete, but you do need a certain amount of brisk exercise to help strengthen your heart. If you have been diagnosed with a heart condition, your doctor may  recommend exercise to help stabilize your condition. To help make exercise a habit, choose safe, fun activities.  Be sure to check with your healthcare provider before starting an exercise program.   Why exercise?  Exercising regularly offers many healthy rewards. It can help you do all of the following:  Improve your blood cholesterol level to help prevent further heart trouble  Lower your blood pressure to help prevent a stroke or heart attack  Control diabetes, or reduce your risk of getting this disease  Improve your heart and lung function  Reach and maintain a healthy weight  Make your muscles stronger and more limber so you can stay active  Prevent falls and fractures by slowing the loss of bone mass (osteoporosis)  Manage stress better  Reduce your blood pressure  Improve your sense of self and your body image  Exercise tips  Ease into your routine. Set small goals. Then build on them.  Exercise on most days. Aim for a total of 150 or more minutes of moderate to  vigorous intensity activity each week. Consider 40 minutes, 3 to 4 times a week. For best results, activity should last for 40 minutes on average. It is OK to work up to the 40 minute period over time. Examples of moderate-intensity activity is walking 1 mile in 15 minutes or 30 to 45 minutes of yard work.  Step up your daily activity level. Along with your exercise program, try being more active throughout the day. Walk instead of drive. Do more household tasks or yard work.  Choose one or more activities you enjoy. Walking is one of the easiest things you can do. You can also try swimming, riding a bike, dancing, or taking an exercise class.  Stop exercising and call your doctor if you:  Have chest pain or feel dizzy or lightheaded  Feel burning, tightness, pressure, or heaviness in your chest, neck, shoulders, back, or arms  Have unusual shortness of breath  Have increased joint or muscle pain  Have palpitations or an irregular heartbeat   Date  Last Reviewed: 5/1/2016  © 6240-7471 [employer] [address] [place] [address], [place] [address]. All rights reserved. This information is not intended as a substitute for professional medical care. Always follow your healthcare professional's instructions.      Eating Heart-Healthy Foods  Eating has a big impact on your heart health. In fact, eating healthier can improve several of your heart risks at once. For instance, it helps you manage weight, cholesterol, and blood pressure. Here are ideas to help you make heart-healthy changes without giving up all the foods and flavors you love.  Getting started  Talk with your healthcare provider about eating plans, such as the DASH or Mediterranean diet. You may also be referred to a dietitian.  Change a few things at a time. Give yourself time to get used to a few eating changes before adding more.  Work to create a tasty, healthy eating plan that you can stick to for the rest of your life.    Goals for healthy eating  Below are some tips to improve your eating habits:  Limit saturated fats and trans fats. Saturated fats raise your levels of cholesterol, so keep these fats to a minimum. They are found in foods such as fatty meats, whole milk, cheese, and palm and coconut oils. Avoid trans fats because they lower good cholesterol as well as raise bad cholesterol. Trans fats are most often found in processed foods.  Reduce sodium (salt) intake. Eating too much salt may increase your blood pressure. Limit your sodium intake to 2,300 milligrams (mg) per day (the amount in 1 teaspoon of salt), or less if your healthcare provider recommends it. Dining out less often and eating fewer processed foods are two great ways to decrease the amount of salt you consume.  Managing calories. A calorie is a unit of energy. Your body burns calories for fuel, but if you eat more calories than your body burns, the extras are stored as fat. Your healthcare provider can help you create  a diet plan to manage your calories. This will likely include eating healthier foods as well as exercising regularly. To help you track your progress, keep a diary to record what you eat and how often you exercise.  Choose the right foods  Aim to make these foods staples of your diet. If you have diabetes, you may have different recommendations than what is listed here:  Fruits and vegetables provide plenty of nutrients without a lot of calories. At meals, fill half your plate with these foods. Split the other half of your plate between whole grains and lean protein.  Whole grains are high in fiber and rich in vitamins and nutrients. Good choices include whole-wheat bread, pasta, and brown rice.  Lean proteins give you nutrition with less fat. Good choices include fish, skinless chicken, and beans.  Low-fat or nonfat dairy provides nutrients without a lot of fat. Try low-fat or nonfat milk, cheese, or yogurt.  Healthy fats can be good for you in small amounts. These are unsaturated fats, such as olive oil, nuts, and fish. Try to have at least 2 servings per week of fatty fish, such as salmon, sardines, mackerel, rainbow trout, and albacore tuna. These contain omega-3 fatty acids, which are good for your heart. Flaxseed is another source of a heart-healthy fat.  More on heart-healthy eating  Read food labels  Healthy eating starts at the grocery store. Be sure to pay attention to food labels on packaged foods. Look for products that are high in fiber and protein, and low in saturated fat, cholesterol, and sodium. Avoid products that contain trans fat. And pay close attention to serving size. For instance, if you plan to eat two servings, double all the numbers on the label.  Prepare food right  A key part of healthy cooking is cutting down on added fat and salt. Look on the internet for lower-fat, lower-sodium recipes. Also, try these tips:  Remove fat from meat and skin from poultry before cooking.  Skim fat from  the surface of soups and sauces.  Broil, boil, bake, steam, grill, and microwave food without added fats.  Choose ingredients that spice up your food without adding calories, fat, or sodium. Try these items: horseradish, hot sauce, lemon, mustard, nonfat salad dressings, and vinegar. For salt-free herbs and spices, try basil, cilantro, cinnamon, pepper, and rosemary.  Date Last Reviewed: 10/1/2017  © 9589-7703 [employer]. [address] [place] [address]. All rights reserved. This information is not intended as a substitute for professional medical care. Always follow your healthcare professional's instructions.       What Is Osteoporosis?  Osteoporosis is a disease that weakens the bones. Weakened bones are more likely to break (fracture). Osteoporosis affects both men and women. But postmenopausal women are most at risk. To help prevent osteoporosis, you need to exercise and nourish your bones throughout your life.    Childhood  The body builds the most bone during these years. That's why boys and girls need foods rich in calcium. They also need plenty of exercise. A healthy diet and exercise helps bones grow strong.  Young adulthood to age 30  During young adulthood, bones become their strongest. This is called peak bone mass. The same good habits that kept bones healthy in childhood help keep bones healthy in adulthood.  Age 30 to menopause  Bone mass declines slightly during these years. Your body makes just enough new bone to maintain peak bone mass. To keep your bones at their peak mass, be sure to exercise and get plenty of calcium.  After menopause  Menopause is when a woman stops having monthly periods. After menopause, the body makes less estrogen (female hormone). This increases bone loss. At this point, treatment may be needed to reduce the risk for fracture. Exercise and calcium can also help keep your bones strong.  Later in life  In later years, both men and women need to take  extra care of their bones. By this point, the body loses more bone than it makes. If too much bone is lost, you may be at increased risk for fractures. With age, the quality and quantity of bone declines. You can lessen bone loss by staying active and increasing your calcium intake. Calcium supplements and other osteoporosis treatments do have risks. So talk with your healthcare provider if you have concerns. If you have osteoporosis, you can also learn ways to increase everyday safety.  Date Last Reviewed: 5/1/2018 © 2000-2018 [employer]. [address] [place] [address]. All rights reserved. This information is not intended as a substitute for professional medical care. Always follow your healthcare professional's instructions.          Preventing Osteoporosis: Meeting Your Calcium Needs    Your body needs calcium to build and repair bones. But it can't make calcium on its own. That's why it's important to eat calcium-rich foods. Some foods are naturally rich in calcium. Others have calcium added (fortified). It's best to get calcium from the foods you eat. But if you can't get enough, you may want to take calcium supplements. To meet your daily calcium needs, try the foods listed below.               Dairy Fish & beans Other sources   Source   Calcium (mg) per serving   Source   Calcium (mg) per serving   Source   Calcium (mg) per serving   Low-fat yogurt, plain   415 mg/8 oz.   Sardines, [place], canned, with bones   351 mg/3 oz.   Oatmeal, instant, fortified   215 mg/1 cup   Nonfat milk   302 mg/1 cup   [place], sockeye, canned, with bones   239 mg/3 oz.   Tofu made with calcium sulfate   204 mg/3 oz.   Low-fat milk   297 mg/1 cup   Soybeans, fresh, boiled   131 mg/1/2 cup   Collards   179 mg/1/2 cup   Swiss cheese   272 mg/1 oz.   White beans, cooked   81 mg/1/2 cup   English muffin, whole wheat   175 mg/1 muffin   Cheddar cheese   205 mg/1 oz.   Navy beans, cooked   79 mg/1/2 cup    Kale   90 mg/1/2 cup   Ice cream strawberry   79 mg/1/2 cup           Orange, navel   56 mg/1 medium   Note: Calcium levels may vary depending on brand and size.  Daily calcium needs  14 to 18 years old: 1,300 mg  19 to 30 years old: 1,000 mg  31 to 50 years old: 1,000 mg  51 to 70 years old, women: 1,200 mg  51 to 70 years old, men: 1,000 mg  Pregnant or nursin to 18 years old: 1,300 mg, 19 to 50 years old: 1,000 mg  Older than 70 (women and men): 1,200 mg   Date Last Reviewed: 2018  © 1693-0777 [employer]. [address] [place] [address], [place] [address]. All rights reserved. This information is not intended as a substitute for professional medical care. Always follow your healthcare professional's instructions.          Vitamin D  Does this test have other names?  25-hydroxyvitamin D (25-high-DROX-ee-VIE-tuh-min D), 25(OH)D  What is this test?  Vitamin D is mainly found in fortified dairy foods, juice, breakfast cereal, and certain fish. This vitamin plays many roles in the body. But because it helps the body absorb calcium from foods and supplements, it's particularly important for bone health. Vitamin D has many additional roles in the body.  Vitamin D comes in several forms. When ultraviolet light, such as sunlight, hits your skin, it creates vitamin D3. D2 is used to fortify dairy foods. Both of these are further processed by your liver and kidneys into a form your body can use. Most tests for vitamin D check the level of a form circulating in the body called 25-hydroxyvitamin D, also called 25(OH)D.   Why do I need this test?  You may need this test if your healthcare provider wants to check your vitamin D levels to find out if you have any risks to bone health. These might be:  Low calcium  Soft bones caused by low vitamin D or problems using it (osteomalacia)  Osteopenia  Osteoporosis  Rickets, in children  You may also need this test if you are at risk for low vitamin D levels. Risks  include:  Being an older adult  Having difficulty absorbing fat from your diet  Having chronic kidney disease  Have dark skin pigmentation  Being a  baby  Vitamin D has many effects in the body. You may need this test to help your healthcare provider diagnose or treat:  Problems with the parathyroid gland  Cancer  Autoimmune diseases, such as multiple sclerosis and Crohn's disease  Psoriasis  Asthma  Weakness or falls    What other tests might I have along with this test?  A healthcare provider may also want to check your parathyroid hormone levels and your calcium levels.   What do my test results mean?  Test results may vary depending on your age, gender, health history, the method used for the test, and other things. Your test results may not mean you have a problem. Ask your healthcare provider what your test results mean for you.   Children and adults need more than 30 nanograms per milliliter (ng/ml) of vitamin D. The optimal level of 25(OH)D is usually between 30 and 60 ng/mL. Recommended daily amounts range from 400 to 800 international units (IU) per day based on your age.  Levels lower than normal can mean you are:  Not making enough vitamin D on your own  Not getting enough vitamin D in your diet  Not absorbing vitamin D from your food as you should  Lower levels may also mean that your body is not converting the vitamin as it should. This might be because of kidney or liver disease.  Above-normal levels may be a sign that you're taking too much in supplement form.   How is this test done?  The test is done with a blood sample. A needle is used to draw blood from a vein in your arm or hand.   Does this test pose any risks?  Having a blood test with a needle carries some risks. These include bleeding, infection, bruising, and feeling lightheaded. When the needle pricks your arm or hand, you may feel a slight sting or pain. Afterward, the site may be sore.   What might affect my test results?  The  amount of time you spend in the sunlight, your diet, and whether you take vitamin D in supplement form can affect your vitamin D levels. Ask your healthcare provider if any health conditions you have or medicines you take could affect your results.  How do I get ready for this test?  Tell your healthcare provider if you take vitamin D supplements. Be sure your healthcare provider knows about all medicines, herbs, vitamins, and supplements you are taking. This includes medicines that don't need a prescription and any illicit drugs you may use.   © 9600-6921 [employer]. [address] [place] [address]. All rights reserved. This information is not intended as a substitute for professional medical care. Always follow your healthcare professional's instructions.          Preventing Osteoporosis: Avoiding Bone Loss  Certain factors can speed up bone loss or decrease bone growth. For example, alcohol, cigarettes, and certain medicines reduce bone mass. Some foods make it hard for your body to absorb calcium.    Things to avoid  Here are things to avoid to help prevent osteoporosis:  Alcohol. This is toxic to bones. It is a major cause of bone loss. Heavy drinking can cause osteoporosis even if you have no other risk factors.  Smoking. This reduces bone mass. Smoking may also interfere with estrogen levels and cause early menopause.  Inactivity. Not being active makes your bones lose strength and become thinner. Over time, thin bones may break. Women who aren't active are at a high risk for osteoporosis.  Certain medicines. Some medicines, such as cortisone, increase bone loss. They also decrease bone growth. Ask your healthcare provider about any side effects of your medicines, and how to prevent them.  Protein-rich or salty foods. Eaten in large amounts, these foods may deplete calcium.  Caffeine. This increases calcium loss. People who drink a lot of coffee, tea, or soda lose more calcium than  those who don't.  Date Last Reviewed: 5/1/2018 © 2000-2018 [employer]. [address] [place] [address]. All rights reserved. This information is not intended as a substitute for professional medical care. Always follow your healthcare professional's instructions.          Living with Osteoporosis: Regular Exercise  If you have osteoporosis, exercise is vital for your health. It can prevent bone fractures and spine changes. It will slow bone loss. Exercise will strengthen your body. It can also be fun. A variety of exercises is best. See below for exercises that can help you. But before you start, talk with your healthcare provider to be sure these exercises are right for you.    Resistance exercises. These build muscle strength and maintain bone mass. They also make you less prone to injury. Exercises include lifting small weights, doing push-ups and sit-ups, using elastic exercise bands, and using weight machines.  Weight-bearing activities. These help your whole body. They also help you maintain bone mass. Activities include walking, dancing, and housework.  Non-weight-bearing exercises. These help prevent back strain and pain. They do this by building the trunk and leg muscles. Exercises that help with flexibility can prevent falls. Examples include swimming, water exercise, and stretching.  Staying safe  Here are tips to stay safe:   Always check with your healthcare provider before starting any new exercise program.  Use weights only as instructed.  Stop any exercise that causes pain.   Date Last Reviewed: 5/1/2018  © 1532-7063 [employer]. [address] [place] [address]. All rights reserved. This information is not intended as a substitute for professional medical care. Always follow your healthcare professional's instructions.

## 2024-07-24 LAB — HPV E6+E7 MRNA CVX QL NAA+PROBE: NEGATIVE

## 2024-07-29 LAB
.: NORMAL
.: NORMAL

## 2024-08-11 LAB
ABSOLUTE BASOPHILS: 32 CELLS/UL (ref 0–200)
ABSOLUTE EOSINOPHILS: 59 CELLS/UL (ref 15–500)
ABSOLUTE LYMPHOCYTES: 1836 CELLS/UL (ref 850–3900)
ABSOLUTE MONOCYTES: 486 CELLS/UL (ref 200–950)
ABSOLUTE NEUTROPHILS: 2986 CELLS/UL (ref 1500–7800)
ALBUMIN/GLOBULIN RATIO: 1.4 (CALC) (ref 1–2.5)
ALBUMIN: 4.1 G/DL (ref 3.6–5.1)
ALKALINE PHOSPHATASE: 83 U/L (ref 37–153)
ALT: 38 U/L (ref 6–29)
AST: 30 U/L (ref 10–35)
BASOPHILS: 0.6 %
BILIRUBIN, TOTAL: 0.5 MG/DL (ref 0.2–1.2)
BUN: 13 MG/DL (ref 7–25)
CALCIUM: 9.4 MG/DL (ref 8.6–10.4)
CARBON DIOXIDE: 23 MMOL/L (ref 20–32)
CHLORIDE: 104 MMOL/L (ref 98–110)
CHOL/HDLC RATIO: 2.6 (CALC)
CHOLESTEROL, TOTAL: 191 MG/DL
CREATININE: 0.86 MG/DL (ref 0.5–1.03)
EGFR: 80 ML/MIN/1.73M2
EOSINOPHILS: 1.1 %
GLOBULIN: 2.9 G/DL (CALC) (ref 1.9–3.7)
GLUCOSE: 111 MG/DL (ref 65–99)
HDL CHOLESTEROL: 74 MG/DL
HEMATOCRIT: 45.1 % (ref 35–45)
HEMOGLOBIN A1C: 5.7 % OF TOTAL HGB
HEMOGLOBIN: 14.9 G/DL (ref 11.7–15.5)
LDL-CHOLESTEROL: 99 MG/DL (CALC)
LYMPHOCYTES: 34 %
MCH: 30 PG (ref 27–33)
MCHC: 33 G/DL (ref 32–36)
MCV: 90.7 FL (ref 80–100)
MONOCYTES: 9 %
MPV: 11 FL (ref 7.5–12.5)
NEUTROPHILS: 55.3 %
NON-HDL CHOLESTEROL: 117 MG/DL (CALC)
PLATELET COUNT: 290 THOUSAND/UL (ref 140–400)
POTASSIUM: 4.5 MMOL/L (ref 3.5–5.3)
PROTEIN, TOTAL: 7 G/DL (ref 6.1–8.1)
RDW: 12.9 % (ref 11–15)
RED BLOOD CELL COUNT: 4.97 MILLION/UL (ref 3.8–5.1)
SODIUM: 141 MMOL/L (ref 135–146)
TRIGLYCERIDES: 88 MG/DL
TSH W/REFLEX TO FT4: 1.01 MIU/L
WHITE BLOOD CELL COUNT: 5.4 THOUSAND/UL (ref 3.8–10.8)

## 2024-08-12 DIAGNOSIS — R73.03 PREDIABETES: Primary | ICD-10-CM

## 2025-06-06 ENCOUNTER — OFFICE VISIT (OUTPATIENT)
Dept: FAMILY MEDICINE CLINIC | Facility: CLINIC | Age: 55
End: 2025-06-06
Payer: COMMERCIAL

## 2025-06-06 VITALS
DIASTOLIC BLOOD PRESSURE: 80 MMHG | OXYGEN SATURATION: 96 % | HEART RATE: 72 BPM | BODY MASS INDEX: 37.42 KG/M2 | RESPIRATION RATE: 16 BRPM | SYSTOLIC BLOOD PRESSURE: 122 MMHG | HEIGHT: 67 IN | TEMPERATURE: 97 F | WEIGHT: 238.38 LBS

## 2025-06-06 DIAGNOSIS — Z23 NEED FOR VACCINATION: ICD-10-CM

## 2025-06-06 DIAGNOSIS — Z12.31 BREAST CANCER SCREENING BY MAMMOGRAM: ICD-10-CM

## 2025-06-06 DIAGNOSIS — R10.33 PERIUMBILICAL ABDOMINAL PAIN: Primary | ICD-10-CM

## 2025-06-06 DIAGNOSIS — K59.09 OTHER CONSTIPATION: ICD-10-CM

## 2025-06-06 DIAGNOSIS — E78.00 PURE HYPERCHOLESTEROLEMIA: ICD-10-CM

## 2025-06-06 DIAGNOSIS — R73.03 PREDIABETES: ICD-10-CM

## 2025-06-06 PROCEDURE — 90471 IMMUNIZATION ADMIN: CPT | Performed by: FAMILY MEDICINE

## 2025-06-06 PROCEDURE — 90677 PCV20 VACCINE IM: CPT | Performed by: FAMILY MEDICINE

## 2025-06-06 PROCEDURE — 99214 OFFICE O/P EST MOD 30 MIN: CPT | Performed by: FAMILY MEDICINE

## 2025-06-06 NOTE — PROGRESS NOTES
Subjective:   Maria Dolores Soliman is a 54 year old female who presents for Pain (Patient is c/o belly button pain )       History/Other:   History of Present Illness  Maria Dolores Soliman is a 54 year old female who presents with abdominal pain and constipation.    She has been experiencing abdominal pain located behind her umbilicus, described as painful upon pressure. The pain began approximately two weeks ago, gradually improved, and resolved before her appointment. The pain subsided the day after she scheduled her visit. No associated nausea, vomiting, fever, chills, blood in the stool or hematochezia. No recent abdominal surgeries.    She reports constipation characterized by straining and passing pebble-like stools, despite having daily bowel movements. She notes increased flatulence, which she attributes to dietary changes, including increased dairy and protein intake.  She is trying to eat low-carb and lose weight.  She acknowledges inadequate hydration, which she believes may contribute to her constipation, especially since she exercises regularly-Monday to Thursday for at least 1 hour or more. Her past medical history includes a clear colonoscopy in 2022.    Her current lifestyle includes regular exercise from Monday to Thursday, consisting of at least 40 minutes on a treadmill or bicycle. She has made dietary changes, cutting out soda and primarily drinking tea and water, although she admits to adding sugar to her tea occasionally. She is working on improving her water intake. No urinary symptoms, dysuria, urinary urgency, hematuria, vaginal bleeding, or lower pelvic or flank pain.  she is postmenopausal and has not experienced any chest pain or dyspnea.        Chief Complaint Reviewed and Verified  Nursing Notes Reviewed and   Verified  Tobacco Reviewed  Allergies Reviewed  Medications Reviewed    Medical History Reviewed  Surgical History Reviewed  OB Status Reviewed    Family History Reviewed   Social History Reviewed         Tobacco:  She has never smoked tobacco.    Current Medications[1]      Review of Systems:  Review of Systems  See pertinent positive and negatives in HPI    Objective:   /80 (BP Location: Left arm, Patient Position: Sitting, Cuff Size: adult)   Pulse 72   Temp 97.4 °F (36.3 °C) (Temporal)   Resp 16   Ht 5' 7\" (1.702 m)   Wt 238 lb 6.4 oz (108.1 kg)   LMP 11/15/2019 (LMP Unknown)   SpO2 96%   BMI 37.34 kg/m²  Estimated body mass index is 37.34 kg/m² as calculated from the following:    Height as of this encounter: 5' 7\" (1.702 m).    Weight as of this encounter: 238 lb 6.4 oz (108.1 kg).  Physical Exam  General: Well-nourished, well hydrated. No acute distress. No pallor. No tachypnea. Non toxic.  HEENT: normocephaly, atraumatic.  Sclera clear and non icteric bilaterally.  Neck: supple. No adenopathy. No thyromegaly.   Heart: RRR without S3 or S4 murmur . Clear S1S2  Lungs: clear to auscultation bilaterally. No rales, rhonchi or wheezes. Good inspiratory and expiratory effort  Abdomen: soft, nontender, nondistended. NL bowel sounds. No HSM.  Extremities: No cyanosis, clubbing, or edema bilaterally.   Skin: no acute rashes  Neuro: AOx3.  Normal gait    Results  DIAGNOSTIC  Colonoscopy: Clear (2022)      Assessment & Plan:   1. Periumbilical abdominal pain (Primary)  -     Comp Metabolic Panel (14)  -     CBC With Differential With Platelet  2. Other constipation  -     TSH W Reflex To Free T4  3. Pure hypercholesterolemia  -     Lipid Panel  4. Prediabetes  -     Hemoglobin A1C  5. Breast cancer screening by mammogram  -     [facility] FARHAT 2D+3D SCREENING BILAT (CPT=77067/26606); Future; Expected date: 06/06/2025  6. Need for vaccination  -     Prevnar 20 (PCV20) [24017]  -     COVID-19 mRNA Vac-Aldo(Pfizer); Inject 0.3 mL (30 mcg total) into the muscle one time for 1 dose.  Dispense: 0.3 mL; Refill: 0    Assessment & Plan  Abdominal Pain resolved  Intermittent abdominal pain  located periumbilically, resolved before the appointment. No current pain, nausea, vomiting, or fever. Differential diagnosis includes constipation, dietary changes, or potential hernia. No umbilical hernia detected on examination. Previous colonoscopy in 2022 was clear. No signs of diverticulitis or appendicitis. Constipation likely related to dietary changes and inadequate water intake. Discussed potential for appendicitis, gastritis, gastric ulcer or diverticulitis if symptoms return or worsen.  - Increase water intake to 75-80 ounces daily, with a schedule of 30 ounces by noon, 30 ounces by 5 PM, and 15 ounces between 5-7 PM.  - Consider adding fiber gummies if constipation persists, noting potential initial increase in gas.  - Seek emergency care if abdominal pain, fever, or vomiting occur.    Constipation  Chronic constipation with straining and pebble-like stools, likely related to dietary changes, increased protein intake, and insufficient water consumption. No blood in stool or significant changes in bowel habits. Previous colonoscopy was clear. Discussed increasing water intake and dietary fiber as initial management steps.  - Increase water intake as per abdominal pain plan.  - Consider fiber gummies if constipation persists.  - Increase intake of leafy green vegetables and monitor bowel habits.    Dysphagia  Intermittent sensation of food impaction during swallowing, likely due to rapid eating and inadequate mastication. No associated chest pain or acid reflux. Advised to modify eating habits to alleviate symptoms.  - Advise taking smaller bites and chewing each piece of food ten times.  - Monitor for persistent symptoms and adjust eating habits accordingly.    Prediabetes and hypercholesterolemia  Patient will continue to follow low-carb Mediterranean diet increase water intake.  Continue exercising at least 300 minutes/week.  Ensure adequate leafy green vegetable intake to least 1-2 servings daily and  multiple vegetable servings  - Cut and discontinue sugary drinks such as sweet tea  - Drink mainly water or black decaf tea-May add lemon  - A1c 5.7 8/2024 overdue for repeat and then recommend monitoring every 6 months  - Encouraged weight loss-states down 10 pounds  - Ordered lipid panel and A1c to be completed in the next 4 to 6 weeks      General Health Maintenance  She is due for a mammogram and has not received the latest COVID-19 booster. Engaged in regular exercise and dietary modifications to manage prediabetes and weight. Discussed the importance of regular boosters every six months for optimal protection.  - Schedule and complete mammogram at [facility].  - Receive COVID-19 booster at local pharmacy and plan for another booster in the fall.  - Continue regular exercise routine, aiming for at least 300 minutes per week.  - Maintain low-carb diet to manage prediabetes.  - Ordered her routine labs before her annual physical and to monitor if any abnormalities with recent abdominal pain      Return in about 7 weeks (around 7/25/2025) for annual physical, fasting labs AM, sooner if needed or abdominal pain resolved.      Rubi Hernadez DO, 6/6/2025, 9:34 AM              [1]   Current Outpatient Medications   Medication Sig Dispense Refill    venlafaxine  MG Oral Capsule SR 24 Hr Take 1 capsule (150 mg total) by mouth once daily. 90 capsule 3    loratadine 10 MG Oral Tab Take 1 tablet (10 mg total) by mouth daily. Alternate with Zyrtec      Vitamin D, Cholecalciferol, 10 MCG (400 UNIT) Oral Cap Take by mouth.      Cetirizine HCl 10 MG Oral Chew Tab Chew 1 tablet (10 mg total) by mouth daily. 90 tablet 0    diphenhydrAMINE HCl, Sleep, 25 MG Oral Tab Take 1 tablet (25 mg total) by mouth nightly as needed for Sleep.      Multiple Vitamins-Minerals (WOMENS MULTIVITAMIN PLUS) Oral Tab Take by mouth.

## 2025-06-06 NOTE — PROGRESS NOTES
The following individual(s) verbally consented to be recorded using ambient AI listening technology and understand that they can each withdraw their consent to this listening technology at any point by asking the clinician to turn off or pause the recording:    Patient name: Maria Dolores Fernandesshahram  Additional names:  no

## 2025-06-07 ENCOUNTER — HOSPITAL ENCOUNTER (OUTPATIENT)
Dept: MAMMOGRAPHY | Facility: HOSPITAL | Age: 55
Discharge: HOME | End: 2025-06-07
Attending: FAMILY MEDICINE
Payer: COMMERCIAL

## 2025-06-07 DIAGNOSIS — Z12.31 BREAST CANCER SCREENING BY MAMMOGRAM: ICD-10-CM

## 2025-06-07 PROCEDURE — 77067 SCR MAMMO BI INCL CAD: CPT | Performed by: FAMILY MEDICINE

## 2025-06-07 PROCEDURE — 77063 BREAST TOMOSYNTHESIS BI: CPT | Performed by: FAMILY MEDICINE

## 2025-06-10 LAB
ABSOLUTE BASOPHILS: 48 CELLS/UL (ref 0–200)
ABSOLUTE EOSINOPHILS: 109 CELLS/UL (ref 15–500)
ABSOLUTE LYMPHOCYTES: 1782 CELLS/UL (ref 850–3900)
ABSOLUTE MONOCYTES: 619 CELLS/UL (ref 200–950)
ABSOLUTE NEUTROPHILS: 4243 CELLS/UL (ref 1500–7800)
ALBUMIN/GLOBULIN RATIO: 1.6 (CALC) (ref 1–2.5)
ALBUMIN: 4.2 G/DL (ref 3.6–5.1)
ALKALINE PHOSPHATASE: 93 U/L (ref 37–153)
ALT: 28 U/L (ref 6–29)
AST: 28 U/L (ref 10–35)
BASOPHILS: 0.7 %
BILIRUBIN, TOTAL: 0.4 MG/DL (ref 0.2–1.2)
BUN: 12 MG/DL (ref 7–25)
CALCIUM: 9.5 MG/DL (ref 8.6–10.4)
CARBON DIOXIDE: 23 MMOL/L (ref 20–32)
CHLORIDE: 102 MMOL/L (ref 98–110)
CHOL/HDLC RATIO: 2.7 (CALC)
CHOLESTEROL, TOTAL: 200 MG/DL
CREATININE: 0.8 MG/DL (ref 0.5–1.03)
EGFR: 87 ML/MIN/1.73M2
EOSINOPHILS: 1.6 %
GLOBULIN: 2.7 G/DL (CALC) (ref 1.9–3.7)
GLUCOSE: 106 MG/DL (ref 65–99)
HDL CHOLESTEROL: 75 MG/DL
HEMATOCRIT: 45.7 % (ref 35–45)
HEMOGLOBIN A1C: 5.7 %
HEMOGLOBIN: 14.5 G/DL (ref 11.7–15.5)
LDL-CHOLESTEROL: 108 MG/DL (CALC)
LYMPHOCYTES: 26.2 %
MCH: 29.2 PG (ref 27–33)
MCHC: 31.7 G/DL (ref 32–36)
MCV: 92.1 FL (ref 80–100)
MONOCYTES: 9.1 %
MPV: 11.3 FL (ref 7.5–12.5)
NEUTROPHILS: 62.4 %
NON-HDL CHOLESTEROL: 125 MG/DL (CALC)
PLATELET COUNT: 294 THOUSAND/UL (ref 140–400)
POTASSIUM: 4.4 MMOL/L (ref 3.5–5.3)
PROTEIN, TOTAL: 6.9 G/DL (ref 6.1–8.1)
RDW: 13.1 % (ref 11–15)
RED BLOOD CELL COUNT: 4.96 MILLION/UL (ref 3.8–5.1)
SODIUM: 135 MMOL/L (ref 135–146)
TRIGLYCERIDES: 83 MG/DL
TSH W/REFLEX TO FT4: 1.34 MIU/L
WHITE BLOOD CELL COUNT: 6.8 THOUSAND/UL (ref 3.8–10.8)

## 2025-06-15 PROBLEM — R92.333 HETEROGENEOUSLY DENSE TISSUE OF BOTH BREASTS ON MAMMOGRAPHY: Status: ACTIVE | Noted: 2025-06-15

## 2025-06-16 NOTE — PROGRESS NOTES
Results reviewed. Released to [employer].   See my chart message to patient.  Recommended complete breast ultrasound.  Order placed.

## (undated) DEVICE — SUTURE MCRYL SZ 4-0 L18IN ABSRB UD L19MM PS-2

## (undated) DEVICE — BREAST-HERNIA-PORT CDS-LF: Brand: MEDLINE INDUSTRIES, INC.

## (undated) DEVICE — 3M™ STERI-DRAPE™ INSTRUMENT POUCH 1018: Brand: STERI-DRAPE™

## (undated) DEVICE — CLIP SUR SM TI HRT SHP WIRE HORZ LIG SYS

## (undated) DEVICE — LIGHT HANDLE

## (undated) DEVICE — PAD,ABDOMINAL,8"X7.5",ST,LF,20/BX: Brand: MEDLINE INDUSTRIES, INC.

## (undated) DEVICE — Device

## (undated) DEVICE — GOWN,SIRUS,FABRIC-REINFORCED,LARGE: Brand: MEDLINE

## (undated) DEVICE — SUTURE VCRL SZ 3-0 L27IN ABSRB UD L26MM SH

## (undated) DEVICE — SLEEVE COMPR M KNEE LEN SGL USE KENDALL SCD

## (undated) DEVICE — CLEAR MONOFILAMENT (POLYDIOXANONE), ABSORBABLE SURGICAL SUTURE: Brand: PDS

## (undated) DEVICE — UNDERPAD INCONT W23XL36IN STD BLU POLYPR BK

## (undated) DEVICE — CLIP LIG M BLU TI HRT SHP WIRE HORZ

## (undated) DEVICE — SOLUTION IRRIG 1000ML 0.9% NACL USP BTL

## (undated) DEVICE — SUT SLK 2-0 18IN FS BK

## (undated) DEVICE — STERILE POLYISOPRENE POWDER-FREE SURGICAL GLOVES: Brand: PROTEXIS

## (undated) DEVICE — DRAPE TAPE: Brand: CONVERTORS

## (undated) DEVICE — DRAPE PACK CHEST

## (undated) DEVICE — ELECTRODE ES L2.75IN XLN STD BLDE MOD E-Z CLN

## (undated) NOTE — LETTER
12/10/20        [address]  [address] [address]      Dear Angela Aguirre,    [facility] records indicate that you have outstanding lab work and or testing that was ordered for you and has not yet been completed:      PPD     To provide you with t

## (undated) NOTE — LETTER
Patient Name: Maria Dolores Soliman  Surgery Date: 1/26/2024  Medical Record: [id number] CSN: [id number]      Surgeon(s):  Kristi Stock MD  Consent Procedure: Right breast wire localized excisional biopsy  Anesthesia Type: MAC     1/29 ALL FAMILY MEMBERS HAVE COVID

## (undated) NOTE — LETTER
10/17/19        [address] [place] [address]      Dear Tyshawn Miller,    [facility] records indicate that you have outstanding lab work and or testing that was ordered for you and has not yet been completed:  Orders Placed This Encounter

## (undated) NOTE — MR AVS SNAPSHOT
After Visit Summary   9/9/2019    Ally Cruz    MRN: [id number]           Visit Information     Date & Time  9/9/2019  6:00 PM Provider  Rebel Farrell DO Department  Mauricio Walker, [facility] Dept.  Phone  [phone] y We Ordered the Following     Normal Orders This Visit    CBC WITH DIFFERENTIAL WITH PLATELET [1736192 CUSTOM]     COMP METABOLIC PANEL (14) [1163196 CUSTOM]     FLULAVAL INFLUENZA VACCINE QUAD PRESERVATIVE FREE 0.5 ML [05903 CPT(R)]     GASTRO - INTERNA [facility] Refill policies:   · Allow 2-3 business days for refills; controlled substances may take                               longer.    · Contact our office 5 days prior to running out of medication or submit request through the [facility] -Vitamin D3  2000 units daily recommended- buy Over-the-counter  -Recommend 1000mg of calcium daily for osteoporosis prevention discussed. Need to ingest 1000mg of calcium daily to prevent osteoporosis later in life.   I.e. one 8 ounce glass of silk Silas If your blood pressure reading is higher than normal, follow the advice of your healthcare provider   Breast cancer All women at average risk in this age group Yearly mammogram should be done until age 47.  At age 54, you can switch to every other year or c smokers with 30 pack-year history of smoking or who quit within 15 years   Obesity All women in this age group At routine exams   Osteoporosis Women who are postmenopausal Ask your healthcare provider   Syphilis Women at increased risk for infection – talk Tetanus/diphtheria/pertussis (Td/Tdap) booster All women in this age group Td every 10 years, or a 1-time dose of Tdap instead of a Td booster after age 25, then Td every 8 years   Zoster All women ages 61 and older 1 dose   Counseling Who needs it How of · Have a family history or personal history of colorectal cancer or polyps  · Have a personal history of type 2 diabetes, Crohn’s disease, or ulcerative colitis  · Have an inherited genetic syndrome like Anderson syndrome (HNPCC) or familial adenomatous polyp less than a minute. This test alone is not enough to screen for colorectal cancer. You will also need one of the below tests.   Screening test choices  Fecal occult blood test (FOBT) or fecal immunochemical test (FIT)  These tests check for blood in stool t the test, you will need to do a bowel prep to clean out your colon. Your healthcare provider will give you instructions on how to do this. During the procedure, you will lie on a table that is part of a special X-ray machine called a CT scanner.  A small tu the day before this test. It might not need to be as complete as the bowel prep for a colonoscopy. You are awake during the procedure, but you may be given medicine to help you relax.  During the test, the healthcare provider guides a thin, flexible, lighte To prepare for the test:  · Talk with your healthcare provider about the risks of the test (see below). Also ask your healthcare provider about alternatives to the test.  · Tell your healthcare provider about any medicines and supplements you take.  Also te © 1477-8010 [employer] [address]. [facility] [facility], [address] [place]. All rights reserved. This information is not intended as a substitute for professional medical care. Always follow your healthcare professional's instructions.       Exercise of moderate-intensity activity is walking 1 mile in 15 minutes or 30 to 45 minutes of yard work. Step up your daily activity level. Along with your exercise program, try being more active throughout the day. Walk instead of drive.  Do more household tasks cholesterol, so keep these fats to a minimum. They are found in foods such as fatty meats, whole milk, cheese, and palm and coconut oils. Avoid trans fats because they lower good cholesterol as well as raise bad cholesterol.  Trans fats are most often found per week of fatty fish, such as salmon, sardines, mackerel, rainbow trout, and albacore tuna. These contain omega-3 fatty acids, which are good for your heart. Flaxseed is another source of a heart-healthy fat.   More on heart-healthy eating  Read food margie girls need foods rich in calcium. They also need plenty of exercise. A healthy diet and exercise helps bones grow strong. [place] adulthood to age 27  During young adulthood, bones become their strongest. This is called peak bone mass.  The same good habits (fortified). It's best to get calcium from the foods you eat. But if you can't get enough, you may want to take calcium supplements. To meet your daily calcium needs, try the foods listed below.                Dairy Fish & beans Other sources   Source   Gerard because it helps the body absorb calcium from foods and supplements, it's particularly important for bone health. Vitamin D has many additional roles in the body. Vitamin D comes in several forms.  When ultraviolet light, such as sunlight, hits your skin, vitamin D. The optimal level of 25(OH)D is usually between 30 and 60 ng/mL. Recommended daily amounts range from 400 to 800 international units (IU) per day based on your age.   Levels lower than normal can mean you are:  · Not making enough vitamin D on yo example, alcohol, cigarettes, and certain medicines reduce bone mass. Some foods make it hard for your body to absorb calcium. Things to avoid  Here are things to avoid to help prevent osteoporosis:  · Alcohol. This is toxic to bones.  It is a major caus weights, doing push-ups and sit-ups, using elastic exercise bands, and using weight machines. Weight-bearing activities. These help your whole body. They also help you maintain bone mass. Activities include walking, dancing, and housework.   Chris Cam HOW TO GET STARTED: HOW TO STAY MOTIVATED:   Start activities slowly and build up over time Do what you like   Get your heart pumping – brisk walking, biking, swimming Even 10 minute increments are effective and add up over the week   2 ½ hours per week – Average cost  $35*    e-VISITS  Average cost  $35*      [address]  Monday - Friday  10:00 am - 10:00 pm  Saturday - Sunday  10:00 am - 4:00 pm      [place]  Monday - Friday  4:00 pm

## (undated) NOTE — LETTER
Date: 1/26/2020    Patient Name: Maria Del Rosario Diaz          To Whom it may concern: This letter has been written at the patient's request. The above patient was seen at the [facility] for treatment of a medical condition.     This patien

## (undated) NOTE — LETTER
12/27/18        [address] [address]      Dear Jas,    [facility] records indicate that you have outstanding lab work and or testing that was ordered for you and has not yet been completed:  Orders Placed This Encounter

## (undated) NOTE — LETTER
10/12/17        [address] [address]      Dear Calvin Hardin,    [address] [facility] records indicate that you have outstanding lab work and or testing that was ordered for you and has not yet been completed:        CBC With Diff      CMP

## (undated) NOTE — LETTER
10/07/20        [address]  [address] [address]      Dear Ena Abarca,    [facility] records indicate that you have outstanding lab work and or testing that was ordered for you and has not yet been completed:        mammo 2d-3d    To provide y

## (undated) NOTE — LETTER
06/10/21        [address] [address]      Dear Caity [facility],    [address] [facility] records indicate that you have outstanding lab work and or testing that was ordered for you and has not yet been completed:      INSURE FECAL GLOBIN by [facility]